# Patient Record
Sex: MALE | Race: WHITE | NOT HISPANIC OR LATINO | Employment: FULL TIME | URBAN - METROPOLITAN AREA
[De-identification: names, ages, dates, MRNs, and addresses within clinical notes are randomized per-mention and may not be internally consistent; named-entity substitution may affect disease eponyms.]

---

## 2018-05-01 ENCOUNTER — TRANSCRIBE ORDERS (OUTPATIENT)
Dept: ADMINISTRATIVE | Facility: HOSPITAL | Age: 33
End: 2018-05-01

## 2018-05-01 DIAGNOSIS — R07.9 CHEST PAIN, UNSPECIFIED TYPE: Primary | ICD-10-CM

## 2018-05-07 ENCOUNTER — TRANSCRIBE ORDERS (OUTPATIENT)
Dept: ADMINISTRATIVE | Facility: HOSPITAL | Age: 33
End: 2018-05-07

## 2018-05-07 ENCOUNTER — APPOINTMENT (OUTPATIENT)
Dept: LAB | Facility: MEDICAL CENTER | Age: 33
End: 2018-05-07
Payer: COMMERCIAL

## 2018-05-07 ENCOUNTER — APPOINTMENT (OUTPATIENT)
Dept: RADIOLOGY | Facility: MEDICAL CENTER | Age: 33
End: 2018-05-07
Payer: COMMERCIAL

## 2018-05-07 DIAGNOSIS — I73.01 RAYNAUD'S DISEASE WITH GANGRENE (HCC): Primary | ICD-10-CM

## 2018-05-07 DIAGNOSIS — I73.00 RAYNAUD'S DISEASE WITHOUT GANGRENE: Primary | ICD-10-CM

## 2018-05-07 DIAGNOSIS — E78.2 MIXED HYPERLIPIDEMIA: ICD-10-CM

## 2018-05-07 DIAGNOSIS — I10 ESSENTIAL HYPERTENSION, BENIGN: ICD-10-CM

## 2018-05-07 DIAGNOSIS — R06.02 SOB (SHORTNESS OF BREATH): ICD-10-CM

## 2018-05-07 DIAGNOSIS — N39.0 URINARY TRACT INFECTION WITHOUT HEMATURIA, SITE UNSPECIFIED: ICD-10-CM

## 2018-05-07 LAB
ALBUMIN SERPL BCP-MCNC: 4.1 G/DL (ref 3.5–5)
ALP SERPL-CCNC: 81 U/L (ref 46–116)
ALT SERPL W P-5'-P-CCNC: 43 U/L (ref 12–78)
ANION GAP SERPL CALCULATED.3IONS-SCNC: 4 MMOL/L (ref 4–13)
AST SERPL W P-5'-P-CCNC: 29 U/L (ref 5–45)
BASOPHILS # BLD AUTO: 0.02 THOUSANDS/ΜL (ref 0–0.1)
BASOPHILS NFR BLD AUTO: 0 % (ref 0–1)
BILIRUB SERPL-MCNC: 0.53 MG/DL (ref 0.2–1)
BILIRUB UR QL STRIP: NEGATIVE
BUN SERPL-MCNC: 8 MG/DL (ref 5–25)
CALCIUM SERPL-MCNC: 9.3 MG/DL (ref 8.3–10.1)
CHLORIDE SERPL-SCNC: 108 MMOL/L (ref 100–108)
CHOLEST SERPL-MCNC: 178 MG/DL (ref 50–200)
CLARITY UR: CLEAR
CO2 SERPL-SCNC: 29 MMOL/L (ref 21–32)
COLOR UR: YELLOW
CREAT SERPL-MCNC: 0.86 MG/DL (ref 0.6–1.3)
EOSINOPHIL # BLD AUTO: 0.08 THOUSAND/ΜL (ref 0–0.61)
EOSINOPHIL NFR BLD AUTO: 1 % (ref 0–6)
ERYTHROCYTE [DISTWIDTH] IN BLOOD BY AUTOMATED COUNT: 13.7 % (ref 11.6–15.1)
ERYTHROCYTE [SEDIMENTATION RATE] IN BLOOD: 1 MM/HOUR (ref 0–10)
GFR SERPL CREATININE-BSD FRML MDRD: 113 ML/MIN/1.73SQ M
GLUCOSE P FAST SERPL-MCNC: 77 MG/DL (ref 65–99)
GLUCOSE UR STRIP-MCNC: NEGATIVE MG/DL
HCT VFR BLD AUTO: 48.6 % (ref 36.5–49.3)
HDLC SERPL-MCNC: 82 MG/DL (ref 40–60)
HGB BLD-MCNC: 16.7 G/DL (ref 12–17)
HGB UR QL STRIP.AUTO: NEGATIVE
KETONES UR STRIP-MCNC: NEGATIVE MG/DL
LDLC SERPL CALC-MCNC: 83 MG/DL (ref 0–100)
LEUKOCYTE ESTERASE UR QL STRIP: NEGATIVE
LYMPHOCYTES # BLD AUTO: 1.27 THOUSANDS/ΜL (ref 0.6–4.47)
LYMPHOCYTES NFR BLD AUTO: 11 % (ref 14–44)
MCH RBC QN AUTO: 32.7 PG (ref 26.8–34.3)
MCHC RBC AUTO-ENTMCNC: 34.4 G/DL (ref 31.4–37.4)
MCV RBC AUTO: 95 FL (ref 82–98)
MONOCYTES # BLD AUTO: 0.86 THOUSAND/ΜL (ref 0.17–1.22)
MONOCYTES NFR BLD AUTO: 7 % (ref 4–12)
NEUTROPHILS # BLD AUTO: 9.54 THOUSANDS/ΜL (ref 1.85–7.62)
NEUTS SEG NFR BLD AUTO: 81 % (ref 43–75)
NITRITE UR QL STRIP: NEGATIVE
NONHDLC SERPL-MCNC: 96 MG/DL
NRBC BLD AUTO-RTO: 0 /100 WBCS
PH UR STRIP.AUTO: 6.5 [PH] (ref 4.5–8)
PLATELET # BLD AUTO: 195 THOUSANDS/UL (ref 149–390)
PMV BLD AUTO: 12 FL (ref 8.9–12.7)
POTASSIUM SERPL-SCNC: 4.8 MMOL/L (ref 3.5–5.3)
PROT SERPL-MCNC: 7.2 G/DL (ref 6.4–8.2)
PROT UR STRIP-MCNC: NEGATIVE MG/DL
RBC # BLD AUTO: 5.11 MILLION/UL (ref 3.88–5.62)
SODIUM SERPL-SCNC: 141 MMOL/L (ref 136–145)
SP GR UR STRIP.AUTO: 1.02 (ref 1–1.03)
TRIGL SERPL-MCNC: 66 MG/DL
TSH SERPL DL<=0.05 MIU/L-ACNC: 0.73 UIU/ML (ref 0.36–3.74)
UROBILINOGEN UR QL STRIP.AUTO: 0.2 E.U./DL
WBC # BLD AUTO: 11.8 THOUSAND/UL (ref 4.31–10.16)

## 2018-05-07 PROCEDURE — 86235 NUCLEAR ANTIGEN ANTIBODY: CPT | Performed by: INTERNAL MEDICINE

## 2018-05-07 PROCEDURE — 80061 LIPID PANEL: CPT | Performed by: INTERNAL MEDICINE

## 2018-05-07 PROCEDURE — 36415 COLL VENOUS BLD VENIPUNCTURE: CPT | Performed by: INTERNAL MEDICINE

## 2018-05-07 PROCEDURE — 85652 RBC SED RATE AUTOMATED: CPT | Performed by: INTERNAL MEDICINE

## 2018-05-07 PROCEDURE — 84443 ASSAY THYROID STIM HORMONE: CPT | Performed by: INTERNAL MEDICINE

## 2018-05-07 PROCEDURE — 71046 X-RAY EXAM CHEST 2 VIEWS: CPT

## 2018-05-07 PROCEDURE — 80053 COMPREHEN METABOLIC PANEL: CPT | Performed by: INTERNAL MEDICINE

## 2018-05-07 PROCEDURE — 85025 COMPLETE CBC W/AUTO DIFF WBC: CPT | Performed by: INTERNAL MEDICINE

## 2018-05-07 PROCEDURE — 81003 URINALYSIS AUTO W/O SCOPE: CPT | Performed by: INTERNAL MEDICINE

## 2018-05-08 LAB — ENA SCL70 AB SER-ACNC: <0.2 AI (ref 0–0.9)

## 2018-07-05 ENCOUNTER — HOSPITAL ENCOUNTER (OUTPATIENT)
Dept: NON INVASIVE DIAGNOSTICS | Facility: HOSPITAL | Age: 33
Discharge: HOME | End: 2018-07-05
Payer: COMMERCIAL

## 2018-07-05 DIAGNOSIS — R07.9 CHEST PAIN, UNSPECIFIED TYPE: ICD-10-CM

## 2018-07-05 PROCEDURE — 93351 STRESS TTE COMPLETE: CPT | Performed by: INTERNAL MEDICINE

## 2018-07-05 PROCEDURE — 93350 STRESS TTE ONLY: CPT

## 2018-07-09 LAB
CHEST PAIN STATEMENT: NORMAL
MAX DIASTOLIC BP: 78 MMHG
MAX HEART RATE: 169 BPM
MAX PREDICTED HEART RATE: 185 BPM
MAX. SYSTOLIC BP: 182 MMHG
PROTOCOL NAME: NORMAL
REASON FOR TERMINATION: NORMAL
TARGET HR FORMULA: NORMAL
TEST INDICATION: NORMAL
TIME IN EXERCISE PHASE: NORMAL

## 2020-05-02 ENCOUNTER — APPOINTMENT (OUTPATIENT)
Dept: RADIOLOGY | Facility: MEDICAL CENTER | Age: 35
End: 2020-05-02
Payer: COMMERCIAL

## 2020-05-02 ENCOUNTER — OFFICE VISIT (OUTPATIENT)
Dept: URGENT CARE | Facility: MEDICAL CENTER | Age: 35
End: 2020-05-02
Payer: COMMERCIAL

## 2020-05-02 VITALS
OXYGEN SATURATION: 97 % | BODY MASS INDEX: 22.52 KG/M2 | HEIGHT: 68 IN | SYSTOLIC BLOOD PRESSURE: 127 MMHG | WEIGHT: 148.6 LBS | TEMPERATURE: 96.9 F | HEART RATE: 99 BPM | DIASTOLIC BLOOD PRESSURE: 75 MMHG

## 2020-05-02 DIAGNOSIS — S65.512A LACERATION OF BLOOD VESSEL OF RIGHT MIDDLE FINGER, INITIAL ENCOUNTER: ICD-10-CM

## 2020-05-02 DIAGNOSIS — S65.512A LACERATION OF BLOOD VESSEL OF RIGHT MIDDLE FINGER, INITIAL ENCOUNTER: Primary | ICD-10-CM

## 2020-05-02 PROCEDURE — 73140 X-RAY EXAM OF FINGER(S): CPT

## 2020-05-02 PROCEDURE — 99213 OFFICE O/P EST LOW 20 MIN: CPT | Performed by: PHYSICIAN ASSISTANT

## 2020-05-02 RX ORDER — CEPHALEXIN 500 MG/1
500 CAPSULE ORAL EVERY 6 HOURS SCHEDULED
Qty: 28 CAPSULE | Refills: 0 | Status: SHIPPED | OUTPATIENT
Start: 2020-05-02 | End: 2020-05-09

## 2020-05-02 RX ORDER — BUPROPION HYDROCHLORIDE 300 MG/1
300 TABLET ORAL DAILY
COMMUNITY

## 2023-03-11 ENCOUNTER — APPOINTMENT (EMERGENCY)
Dept: RADIOLOGY | Facility: HOSPITAL | Age: 38
End: 2023-03-11

## 2023-03-11 ENCOUNTER — APPOINTMENT (EMERGENCY)
Dept: CT IMAGING | Facility: HOSPITAL | Age: 38
End: 2023-03-11

## 2023-03-11 ENCOUNTER — HOSPITAL ENCOUNTER (EMERGENCY)
Facility: HOSPITAL | Age: 38
End: 2023-03-11
Attending: EMERGENCY MEDICINE

## 2023-03-11 ENCOUNTER — HOSPITAL ENCOUNTER (INPATIENT)
Facility: HOSPITAL | Age: 38
LOS: 2 days | Discharge: HOME | End: 2023-03-13
Attending: EMERGENCY MEDICINE | Admitting: EMERGENCY MEDICINE

## 2023-03-11 VITALS
RESPIRATION RATE: 16 BRPM | BODY MASS INDEX: 22.31 KG/M2 | TEMPERATURE: 97.9 F | DIASTOLIC BLOOD PRESSURE: 86 MMHG | SYSTOLIC BLOOD PRESSURE: 130 MMHG | WEIGHT: 145.5 LBS | OXYGEN SATURATION: 99 % | HEART RATE: 87 BPM

## 2023-03-11 DIAGNOSIS — F32.A DEPRESSION: ICD-10-CM

## 2023-03-11 DIAGNOSIS — F10.939 ALCOHOL WITHDRAWAL (HCC): Primary | ICD-10-CM

## 2023-03-11 DIAGNOSIS — F10.20 ALCOHOL USE DISORDER, SEVERE, DEPENDENCE (HCC): Primary | ICD-10-CM

## 2023-03-11 DIAGNOSIS — K29.20 ACUTE ALCOHOLIC GASTRITIS WITHOUT HEMORRHAGE: ICD-10-CM

## 2023-03-11 DIAGNOSIS — R74.8 ELEVATED LIVER ENZYMES: ICD-10-CM

## 2023-03-11 DIAGNOSIS — K29.70 GASTRITIS: ICD-10-CM

## 2023-03-11 DIAGNOSIS — I85.00 VARICES, ESOPHAGEAL (HCC): ICD-10-CM

## 2023-03-11 DIAGNOSIS — K76.6 PORTAL HYPERTENSION (HCC): ICD-10-CM

## 2023-03-11 DIAGNOSIS — I10 HYPERTENSION, UNSPECIFIED TYPE: ICD-10-CM

## 2023-03-11 DIAGNOSIS — K70.10 ALCOHOLIC HEPATITIS WITHOUT ASCITES: ICD-10-CM

## 2023-03-11 DIAGNOSIS — I86.4 VARICES, GASTRIC: ICD-10-CM

## 2023-03-11 PROBLEM — N17.9 AKI (ACUTE KIDNEY INJURY) (HCC): Status: ACTIVE | Noted: 2023-03-11

## 2023-03-11 PROBLEM — E87.1 HYPONATREMIA: Status: ACTIVE | Noted: 2023-03-11

## 2023-03-11 PROBLEM — R79.89 ELEVATED SERUM CREATININE: Status: ACTIVE | Noted: 2023-03-11

## 2023-03-11 LAB
ALBUMIN SERPL BCP-MCNC: 3.2 G/DL (ref 3.5–5)
ALP SERPL-CCNC: 416 U/L (ref 34–104)
ALT SERPL W P-5'-P-CCNC: 166 U/L (ref 7–52)
ANION GAP SERPL CALCULATED.3IONS-SCNC: 10 MMOL/L (ref 4–13)
ANION GAP SERPL CALCULATED.3IONS-SCNC: 3 MMOL/L (ref 5–14)
APTT PPP: 26 SECONDS (ref 23–37)
AST SERPL W P-5'-P-CCNC: 331 U/L (ref 13–39)
BASOPHILS # BLD AUTO: 0.03 THOUSANDS/ÂΜL (ref 0–0.1)
BASOPHILS NFR BLD AUTO: 0 % (ref 0–1)
BILIRUB SERPL-MCNC: 4.29 MG/DL (ref 0.2–1)
BUN SERPL-MCNC: 6 MG/DL (ref 5–25)
BUN SERPL-MCNC: 7 MG/DL (ref 5–25)
CALCIUM ALBUM COR SERPL-MCNC: 9.3 MG/DL (ref 8.3–10.1)
CALCIUM SERPL-MCNC: 8.3 MG/DL (ref 8.4–10.2)
CALCIUM SERPL-MCNC: 8.7 MG/DL (ref 8.4–10.2)
CHLORIDE SERPL-SCNC: 103 MMOL/L (ref 96–108)
CHLORIDE SERPL-SCNC: 97 MMOL/L (ref 96–108)
CO2 SERPL-SCNC: 26 MMOL/L (ref 21–32)
CO2 SERPL-SCNC: 28 MMOL/L (ref 21–32)
CREAT SERPL-MCNC: 0.91 MG/DL (ref 0.7–1.5)
CREAT SERPL-MCNC: 1.01 MG/DL (ref 0.6–1.3)
EOSINOPHIL # BLD AUTO: 0.02 THOUSAND/ÂΜL (ref 0–0.61)
EOSINOPHIL NFR BLD AUTO: 0 % (ref 0–6)
ERYTHROCYTE [DISTWIDTH] IN BLOOD BY AUTOMATED COUNT: 14.9 % (ref 11.6–15.1)
ETHANOL SERPL-MCNC: 20 MG/DL
GFR SERPL CREATININE-BSD FRML MDRD: 105 ML/MIN/1.73SQ M
GFR SERPL CREATININE-BSD FRML MDRD: 93 ML/MIN/1.73SQ M
GLUCOSE SERPL-MCNC: 108 MG/DL (ref 70–99)
GLUCOSE SERPL-MCNC: 73 MG/DL (ref 65–140)
HCT VFR BLD AUTO: 36.4 % (ref 36.5–49.3)
HGB BLD-MCNC: 12.1 G/DL (ref 12–17)
IMM GRANULOCYTES # BLD AUTO: 0.05 THOUSAND/UL (ref 0–0.2)
IMM GRANULOCYTES NFR BLD AUTO: 1 % (ref 0–2)
INR PPP: 0.98 (ref 0.84–1.19)
LIPASE SERPL-CCNC: 36 U/L (ref 11–82)
LYMPHOCYTES # BLD AUTO: 2.62 THOUSANDS/ÂΜL (ref 0.6–4.47)
LYMPHOCYTES NFR BLD AUTO: 36 % (ref 14–44)
MCH RBC QN AUTO: 34.5 PG (ref 26.8–34.3)
MCHC RBC AUTO-ENTMCNC: 33.2 G/DL (ref 31.4–37.4)
MCV RBC AUTO: 104 FL (ref 82–98)
MONOCYTES # BLD AUTO: 0.44 THOUSAND/ÂΜL (ref 0.17–1.22)
MONOCYTES NFR BLD AUTO: 6 % (ref 4–12)
NEUTROPHILS # BLD AUTO: 4.16 THOUSANDS/ÂΜL (ref 1.85–7.62)
NEUTS SEG NFR BLD AUTO: 57 % (ref 43–75)
NRBC BLD AUTO-RTO: 0 /100 WBCS
PLATELET # BLD AUTO: 148 THOUSANDS/UL (ref 149–390)
PMV BLD AUTO: 11.8 FL (ref 8.9–12.7)
POTASSIUM SERPL-SCNC: 3.8 MMOL/L (ref 3.5–5.3)
POTASSIUM SERPL-SCNC: 4.1 MMOL/L (ref 3.5–5.3)
PROT SERPL-MCNC: 5.9 G/DL (ref 6.4–8.4)
PROTHROMBIN TIME: 13.2 SECONDS (ref 11.6–14.5)
RBC # BLD AUTO: 3.51 MILLION/UL (ref 3.88–5.62)
SODIUM SERPL-SCNC: 133 MMOL/L (ref 135–147)
SODIUM SERPL-SCNC: 134 MMOL/L (ref 135–147)
WBC # BLD AUTO: 7.32 THOUSAND/UL (ref 4.31–10.16)

## 2023-03-11 RX ORDER — SODIUM CHLORIDE, SODIUM LACTATE, POTASSIUM CHLORIDE, CALCIUM CHLORIDE 600; 310; 30; 20 MG/100ML; MG/100ML; MG/100ML; MG/100ML
1000 INJECTION, SOLUTION INTRAVENOUS CONTINUOUS
Status: DISCONTINUED | OUTPATIENT
Start: 2023-03-11 | End: 2023-03-11 | Stop reason: HOSPADM

## 2023-03-11 RX ORDER — SUCRALFATE 1 G/1
1 TABLET ORAL
Status: DISCONTINUED | OUTPATIENT
Start: 2023-03-11 | End: 2023-03-13 | Stop reason: HOSPADM

## 2023-03-11 RX ORDER — TRAZODONE HYDROCHLORIDE 50 MG/1
50 TABLET ORAL
Status: DISCONTINUED | OUTPATIENT
Start: 2023-03-11 | End: 2023-03-13 | Stop reason: HOSPADM

## 2023-03-11 RX ORDER — PANTOPRAZOLE SODIUM 40 MG/10ML
40 INJECTION, POWDER, LYOPHILIZED, FOR SOLUTION INTRAVENOUS EVERY 12 HOURS SCHEDULED
Status: DISCONTINUED | OUTPATIENT
Start: 2023-03-11 | End: 2023-03-13

## 2023-03-11 RX ORDER — MAGNESIUM HYDROXIDE/ALUMINUM HYDROXICE/SIMETHICONE 120; 1200; 1200 MG/30ML; MG/30ML; MG/30ML
30 SUSPENSION ORAL EVERY 6 HOURS PRN
Status: DISCONTINUED | OUTPATIENT
Start: 2023-03-11 | End: 2023-03-13 | Stop reason: HOSPADM

## 2023-03-11 RX ORDER — NICOTINE 21 MG/24HR
21 PATCH, TRANSDERMAL 24 HOURS TRANSDERMAL ONCE
Status: COMPLETED | OUTPATIENT
Start: 2023-03-11 | End: 2023-03-12

## 2023-03-11 RX ORDER — PHENOBARBITAL 64.8 MG/1
64.8 TABLET ORAL ONCE
Status: COMPLETED | OUTPATIENT
Start: 2023-03-11 | End: 2023-03-11

## 2023-03-11 RX ORDER — LIDOCAINE HYDROCHLORIDE 20 MG/ML
15 SOLUTION OROPHARYNGEAL 4 TIMES DAILY PRN
Status: DISCONTINUED | OUTPATIENT
Start: 2023-03-11 | End: 2023-03-13 | Stop reason: HOSPADM

## 2023-03-11 RX ORDER — NICOTINE 21 MG/24HR
21 PATCH, TRANSDERMAL 24 HOURS TRANSDERMAL DAILY
Status: DISCONTINUED | OUTPATIENT
Start: 2023-03-12 | End: 2023-03-13 | Stop reason: HOSPADM

## 2023-03-11 RX ORDER — LANOLIN ALCOHOL/MO/W.PET/CERES
6 CREAM (GRAM) TOPICAL
Status: DISCONTINUED | OUTPATIENT
Start: 2023-03-11 | End: 2023-03-13 | Stop reason: HOSPADM

## 2023-03-11 RX ORDER — SODIUM CHLORIDE 9 MG/ML
100 INJECTION, SOLUTION INTRAVENOUS CONTINUOUS
Status: DISCONTINUED | OUTPATIENT
Start: 2023-03-11 | End: 2023-03-12

## 2023-03-11 RX ORDER — ONDANSETRON 2 MG/ML
4 INJECTION INTRAMUSCULAR; INTRAVENOUS EVERY 6 HOURS PRN
Status: DISCONTINUED | OUTPATIENT
Start: 2023-03-11 | End: 2023-03-13 | Stop reason: HOSPADM

## 2023-03-11 RX ORDER — LORAZEPAM 2 MG/ML
1 INJECTION INTRAMUSCULAR ONCE
Status: COMPLETED | OUTPATIENT
Start: 2023-03-11 | End: 2023-03-11

## 2023-03-11 RX ADMIN — NICOTINE 21 MG: 21 PATCH, EXTENDED RELEASE TRANSDERMAL at 17:20

## 2023-03-11 RX ADMIN — SODIUM CHLORIDE, SODIUM LACTATE, POTASSIUM CHLORIDE, AND CALCIUM CHLORIDE 1000 ML: .6; .31; .03; .02 INJECTION, SOLUTION INTRAVENOUS at 12:38

## 2023-03-11 RX ADMIN — PHENOBARBITAL 64.8 MG: 64.8 TABLET ORAL at 20:09

## 2023-03-11 RX ADMIN — LORAZEPAM 1 MG: 2 INJECTION INTRAMUSCULAR; INTRAVENOUS at 10:40

## 2023-03-11 RX ADMIN — FOLIC ACID: 5 INJECTION, SOLUTION INTRAMUSCULAR; INTRAVENOUS; SUBCUTANEOUS at 17:36

## 2023-03-11 RX ADMIN — SUCRALFATE 1 G: 1 TABLET ORAL at 21:14

## 2023-03-11 RX ADMIN — SODIUM CHLORIDE 125 ML/HR: 0.9 INJECTION, SOLUTION INTRAVENOUS at 15:22

## 2023-03-11 RX ADMIN — PANTOPRAZOLE SODIUM 40 MG: 40 INJECTION, POWDER, FOR SOLUTION INTRAVENOUS at 20:06

## 2023-03-11 RX ADMIN — SODIUM CHLORIDE 125 ML/HR: 0.9 INJECTION, SOLUTION INTRAVENOUS at 23:31

## 2023-03-11 RX ADMIN — IOHEXOL 100 ML: 350 INJECTION, SOLUTION INTRAVENOUS at 10:48

## 2023-03-11 RX ADMIN — TRAZODONE HYDROCHLORIDE 50 MG: 50 TABLET ORAL at 21:31

## 2023-03-11 RX ADMIN — SUCRALFATE 1 G: 1 TABLET ORAL at 17:21

## 2023-03-11 NOTE — ED PROVIDER NOTES
History  Chief Complaint   Patient presents with   • Vomiting     Pt /o vomiting x1 month, dehydration, hx of alcohol abuse going to rehab soon, last drink yesterday     64-year-old male presents to the emergency department with complaints of abdominal pain  States that he has had some ongoing issues with nausea and vomiting with inability to tolerate food or beverage over the past few days  Describes diffuse abdominal pain with some radiation to the hips bilaterally  History of alcohol abuse  States that he drinks approximately 4 cases of beer a week  Last drink was yesterday  Has made arrangements to go to a detox facility in 2 days  Has not been to detox before  History provided by:  Patient and parent   used: No    Vomiting  Severity:  Moderate  Timing:  Intermittent  Progression:  Unchanged  Chronicity:  New  Recent urination:  Normal  Relieved by:  Nothing  Ineffective treatments:  None tried  Associated symptoms: abdominal pain, cough and diarrhea    Associated symptoms: no arthralgias, no chills, no fever, no headaches, no myalgias, no sore throat and no URI        Prior to Admission Medications   Prescriptions Last Dose Informant Patient Reported? Taking? buPROPion (WELLBUTRIN XL) 300 mg 24 hr tablet   Yes No   Sig: Take 300 mg by mouth daily      Facility-Administered Medications: None       Past Medical History:   Diagnosis Date   • Anxiety        History reviewed  No pertinent surgical history  History reviewed  No pertinent family history  I have reviewed and agree with the history as documented      E-Cigarette/Vaping   • E-Cigarette Use Never User      E-Cigarette/Vaping Substances   • Nicotine No    • THC No    • CBD No    • Flavoring No    • Other No    • Unknown No      Social History     Tobacco Use   • Smoking status: Every Day     Packs/day: 1 00     Years: 20 00     Pack years: 20 00     Types: Cigarettes     Passive exposure: Current   • Smokeless tobacco: Current     Types: Chew   Vaping Use   • Vaping Use: Never used   Substance Use Topics   • Alcohol use: Yes     Alcohol/week: 90 0 standard drinks     Types: 90 Cans of beer per week   • Drug use: Yes     Frequency: 1 0 times per week     Types: Marijuana       Review of Systems   Constitutional: Negative for activity change, appetite change, chills and fever  HENT: Negative for congestion, dental problem, drooling, ear discharge, ear pain, mouth sores, nosebleeds, rhinorrhea, sore throat and trouble swallowing  Eyes: Negative for pain, discharge and itching  Respiratory: Positive for cough  Negative for chest tightness, shortness of breath and wheezing  Cardiovascular: Negative for chest pain and palpitations  Gastrointestinal: Positive for abdominal pain, diarrhea and vomiting  Negative for blood in stool, constipation and nausea  Endocrine: Negative for cold intolerance and heat intolerance  Genitourinary: Negative for difficulty urinating, dysuria, flank pain, frequency and urgency  Musculoskeletal: Negative for arthralgias and myalgias  Skin: Negative for rash and wound  Allergic/Immunologic: Negative for food allergies and immunocompromised state  Neurological: Negative for dizziness, seizures, syncope, weakness, numbness and headaches  Psychiatric/Behavioral: Negative for agitation, behavioral problems and confusion  Physical Exam  Physical Exam  Vitals and nursing note reviewed  Constitutional:       General: He is not in acute distress  Appearance: He is not diaphoretic  HENT:      Head: Normocephalic and atraumatic  Right Ear: External ear normal       Left Ear: External ear normal       Mouth/Throat:      Mouth: Mucous membranes are dry  Eyes:      Conjunctiva/sclera: Conjunctivae normal    Neck:      Vascular: No JVD  Trachea: No tracheal deviation  Cardiovascular:      Rate and Rhythm: Normal rate and regular rhythm        Heart sounds: Normal heart sounds  No murmur heard  No friction rub  No gallop  Pulmonary:      Effort: Pulmonary effort is normal  No respiratory distress  Breath sounds: Normal breath sounds  No wheezing or rales  Chest:      Chest wall: No tenderness  Abdominal:      General: Bowel sounds are normal  There is no distension  Palpations: Abdomen is soft  Tenderness: There is abdominal tenderness  There is no guarding  Musculoskeletal:         General: No tenderness or deformity  Normal range of motion  Lymphadenopathy:      Cervical: No cervical adenopathy  Skin:     General: Skin is warm and dry  Findings: No erythema or rash  Neurological:      General: No focal deficit present  Mental Status: He is alert and oriented to person, place, and time  Mental status is at baseline  GCS: GCS eye subscore is 4  GCS verbal subscore is 5  GCS motor subscore is 6  Motor: Tremor present  Psychiatric:         Mood and Affect: Mood is anxious           Behavior: Behavior normal          Vital Signs  ED Triage Vitals   Temperature Pulse Respirations Blood Pressure SpO2   03/11/23 0940 03/11/23 0940 03/11/23 0940 03/11/23 0940 03/11/23 0940   97 9 °F (36 6 °C) 101 18 (!) 179/103 99 %      Temp Source Heart Rate Source Patient Position - Orthostatic VS BP Location FiO2 (%)   03/11/23 0940 03/11/23 0940 03/11/23 0940 03/11/23 1044 --   Oral Monitor Sitting Right arm       Pain Score       --                  Vitals:    03/11/23 1100 03/11/23 1200 03/11/23 1300 03/11/23 1400   BP: 142/92 132/85 140/90 130/86   Pulse: 92 101 93 87   Patient Position - Orthostatic VS: Sitting Sitting Sitting Sitting         Visual Acuity      ED Medications  Medications   LORazepam (ATIVAN) injection 1 mg (1 mg Intravenous Given 3/11/23 1040)   iohexol (OMNIPAQUE) 350 MG/ML injection (SINGLE-DOSE) 100 mL (100 mL Intravenous Given 3/11/23 1048)       Diagnostic Studies  Results Reviewed     Procedure Component Value Units Date/Time    Comprehensive metabolic panel [[id number]]  (Abnormal) Collected: 03/11/23 0955    Lab Status: Final result Specimen: Blood from Arm, Left Updated: 03/11/23 1026     Sodium 133 mmol/L      Potassium 4 1 mmol/L      Chloride 97 mmol/L      CO2 26 mmol/L      ANION GAP 10 mmol/L      BUN 7 mg/dL      Creatinine 1 01 mg/dL      Glucose 73 mg/dL      Calcium 8 7 mg/dL      Corrected Calcium 9 3 mg/dL       U/L       U/L      Alkaline Phosphatase 416 U/L      Total Protein 5 9 g/dL      Albumin 3 2 g/dL      Total Bilirubin 4 29 mg/dL      eGFR 93 ml/min/1 73sq m     Narrative:      [place] guidelines for Chronic Kidney Disease (CKD):   •  Stage 1 with normal or high GFR (GFR > 90 mL/min/1 73 square meters)  •  Stage 2 Mild CKD (GFR = 60-89 mL/min/1 73 square meters)  •  Stage 3A Moderate CKD (GFR = 45-59 mL/min/1 73 square meters)  •  Stage 3B Moderate CKD (GFR = 30-44 mL/min/1 73 square meters)  •  Stage 4 Severe CKD (GFR = 15-29 mL/min/1 73 square meters)  •  Stage 5 End Stage CKD (GFR <15 mL/min/1 73 square meters)  Note: GFR calculation is accurate only with a steady state creatinine    Lipase [[id number]]  (Normal) Collected: 03/11/23 0955    Lab Status: Final result Specimen: Blood from Arm, Left Updated: 03/11/23 1026     Lipase 36 u/L     Ethanol [[id number]]  (Abnormal) Collected: 03/11/23 0955    Lab Status: Final result Specimen: Blood from Arm, Left Updated: 03/11/23 1025     Ethanol Lvl 20 mg/dL     Protime-INR [[id number]]  (Normal) Collected: 03/11/23 0955    Lab Status: Final result Specimen: Blood from Arm, Left Updated: 03/11/23 1022     Protime 13 2 seconds      INR 0 98    APTT [[id number]]  (Normal) Collected: 03/11/23 0955    Lab Status: Final result Specimen: Blood from Arm, Left Updated: 03/11/23 1022     PTT 26 seconds     CBC and differential [[id number]]  (Abnormal) Collected: 03/11/23 0955    Lab Status: Final result Specimen: Blood from Arm, Left Updated: 03/11/23 1015     WBC 7 32 Thousand/uL      RBC 3 51 Million/uL      Hemoglobin 12 1 g/dL      Hematocrit 36 4 %       fL      MCH 34 5 pg      MCHC 33 2 g/dL      RDW 14 9 %      MPV 11 8 fL      Platelets 176 Thousands/uL      nRBC 0 /100 WBCs      Neutrophils Relative 57 %      Immat GRANS % 1 %      Lymphocytes Relative 36 %      Monocytes Relative 6 %      Eosinophils Relative 0 %      Basophils Relative 0 %      Neutrophils Absolute 4 16 Thousands/µL      Immature Grans Absolute 0 05 Thousand/uL      Lymphocytes Absolute 2 62 Thousands/µL      Monocytes Absolute 0 44 Thousand/µL      Eosinophils Absolute 0 02 Thousand/µL      Basophils Absolute 0 03 Thousands/µL     Rapid drug screen, urine [[id number]]     Lab Status: No result Specimen: Urine     UA w Reflex to Microscopic w Reflex to Culture [[id number]]     Lab Status: No result Specimen: Urine                  CT abdomen pelvis with contrast   Final Result by Jenifer Van MD (03/11 1158)      Hepatomegaly with significant fatty infiltration  The gallbladder is contracted with possible wall thickening  Gastric fold thickening appears be present in could be related to gastritis  Endoscopic follow-up should be considered  A few prominent small bowel loops are present without zone of transition and could be related to ileus or gastroenteritis  Possible small varices in the gastrohepatic ligament could suggest some portal hypertension  Follow-up with GI is advised        This was discussed with Dr Lya Mccoy on 3/11/2023      Workstation performed: [id number]         XR chest 2 views   ED Interpretation by Emily Cruz PA-C (03/11 1014)   Clear lungs      Final Result by Ac Centeno MD (03/11 1396)      No acute consolidation or congestion                  Workstation performed: [id number]                    Procedures  Procedures         ED Course                                             Medical Decision Making  Differential diagnosis includes but not limited to: Alcohol withdrawal, electrolyte abnormality, pancreatitis,    Amount and/or Complexity of Data Reviewed  Labs: ordered  Radiology: ordered  Disposition  Final diagnoses:   Alcohol withdrawal ([facility] )   Elevated liver enzymes     Time reflects when diagnosis was documented in both MDM as applicable and the Disposition within this note     Time User Action Codes Description Comment    3/11/2023  1:09 PM Alexia James Add [F10 [id number]] Alcohol withdrawal ([facility] )     3/11/2023  1:09 PM Alexia James Add [R74 [id number]] Elevated liver enzymes       ED Disposition     ED Disposition   Transfer to Another Facility-In Network    Condition   --    Date/Time   Sat Mar 11, 2023  1:09 PM    Comment   Neto Menendez should be transferred out to [facility]             MD Jayant Gay Most Recent Value   Patient Condition The patient has been stabilized such that within reasonable medical probability, no material deterioration of the patient condition or the condition of the unborn child(teresa) is likely to result from the transfer   Reason for Transfer Level of Care needed not available at this facility   Benefits of Transfer Specialized equipment and/or services available at the receiving facility (Include comment)________________________   Risks of Transfer Potential for delay in receiving treatment, Potential deterioration of medical condition, Loss of IV, Increased discomfort during transfer   Accepting Physician Dr David Solis Name, [address] [facility]    (Name & Tel number) Matthew Dyer   Sending MD Clarissa García PA-C   Provider Certification General risk, such as traffic hazards, adverse weather conditions, rough terrain or turbulence, possible failure of equipment (including vehicle or aircraft), or consequences of actions of persons outside the control of the transport personnel      RN Documentation    Flowsheet Row Most Recent Value   Accepting Facility Name, [facility]    (Name & Tel number) Adrianne Mosqueda      Follow-up Information    None         Discharge Medication List as of 3/11/2023  2:47 PM      CONTINUE these medications which have NOT CHANGED    Details   buPROPion (WELLBUTRIN XL) 300 mg 24 hr tablet Take 300 mg by mouth daily, Historical Med             No discharge procedures on file      PDMP Review     None          ED Provider  Electronically Signed by           Jerrica Gloria PA-C  03/11/23 9595

## 2023-03-11 NOTE — H&P
[facility]  H&P- Floria Lie 1985, 42 y o  male MRN: [id number]  Unit/Bed#: 5T DETOX 510-01 Encounter: [id number]  Primary Care Provider: Anat Agudelo MD   Date and time admitted to hospital: 3/11/2023  3:07 PM    Reason for Admission/Principal Problem: Ethanol withdrawal, Ethanol use disorder  Admitting Provider: Aashish Dao PA-C  Attending Provider: Marlene Looney DO   3/11/2023  3:07 PM      * Alcohol withdrawal syndrome with complication [facility])  Assessment & Plan  H/o chronic daily alcohol consumption, denies h/o withdrawal seizures  · States he has not stopped drinking long enough to experience withdrawal   Last reported drink 3/10/2023 around 2300; 1 bottle of wine  Ethanol 20 (3/11/2023 0955)  Received 1 mg Ativan IV in ED PTA  Follow SEWS protocol with symptom-triggered phenobarbital for medically-assisted alcohol withdrawal  Current symptoms appear mild- states symptoms improved following ativan and   Continue to monitor vitals, symptoms and administer additional phenobarbital as indicated   · Telemetry and continuous pulse-ox monitoring     Alcoholic hepatitis without ascites  Assessment & Plan  Recent Labs     03/11/23  0955   *   *   ALKPHOS 416*   TBILI 4 29*     · Reports intermittent epigastric/lower abdominal pain and nausea with non-bloody emesis daily x approximately 1 month   · Denies acute abdominal pain at rest currently; reports symptoms have improved since he was in the ED   · In setting of chronic alcohol use  · LFTs and bilirubin elevated on admission  · INR WNL   · Calculated MDF: 9 8  · CT abd/pelvis 3/11/2023: "Hepatomegaly with significant fatty infiltration   Possible small varices in the gastrohepatic ligament could suggest some portal hypertension"   · On exam: scleral icterus and jaundice noted, mild diffuse abdominal discomfort  · Continue to monitor CMP, check direct bilirubin  · Monitor with supportive care   · Will check RUQ U/S  · Consult GI given CT findings, appreciate recommendations   · Encourage alcohol cessation     Alcoholic gastritis  Assessment & Plan  · Reports intermittent epigastric/lower abdominal pain, anorexia, and nausea with non-bloody emesis daily x approximately 1 month   · Denies acute abdominal pain at rest currently; reports symptoms have improved since he was in the ED   · CT a/p 3/11/2023: "Gastric fold thickening appears be present in could be related to gastritis  Endoscopic follow-up should be considered"  · Lipase WNL   · Initiate IV protonix 40 mg q 12, carafate  · Supportive meds including zofran, mylanta, viscous lidocaine  · Consult GI given CT findings, appreciate recommendations  · Encourage PO intake as tolerated: non-ulcerogenic diet   · Encourage alcohol cessation     EDWIN (acute kidney injury) ([facility] [address] )  Assessment & Plan  · Creatinine 1 01 on admission, in setting of dehydration 2/2 decreased PO intake and vomiting   · Unclear baseline 2/2 limited historical data: most recent CMP 5/7/2018- creatinine 0 86  · Initiate IVFs  · Continue to monitor and encourage adequate PO intake     Nausea and vomiting  Assessment & Plan  · Reports intermittent epigastric/lower abdominal pain, anorexia, and nausea with non-bloody emesis daily x approximately 1 month   · Denies acute abdominal pain at rest currently; reports symptoms have improved since he was in the ED   · Denies diarrhea   · CT abd/pelvis 3/11/2023: "Hepatomegaly with significant fatty infiltration  The gallbladder is contracted with possible wall thickening  Gastric fold thickening appears be present in could be related to gastritis  Endoscopic follow-up should be considered  A few prominent small bowel loops are present without zone of transition and could be related to ileus or gastroenteritis  Possible small varices in the gastrohepatic ligament could suggest some portal hypertension   No ascites"  · See management of alcoholic hepatitis and alcoholic gastritis above   · Continue to monitor with supportive care     Thrombocytopenia [facility])  Assessment & Plan  Recent Labs     03/11/23  0955   *      · Mild, in setting of chronic alcohol use   · Likely 2/2 myelosuppression  · No evidence of acute bleeding  · Initiate thiamine and folic acid  · Continue to monitor  · Encourage alcohol cessation     Macrocytic anemia  Assessment & Plan  Recent Labs     03/11/23  0955   HGB 12 1   HCT 36 4*     · In setting of chronic alcohol use  · 2/2 myelosuppression  · No evidence of acute bleeding- denies hematemesis, melena, hematochezia  · Initiate thiamine, folic acid  · Continue to monitor  · Encourage alcohol cessation     Hyponatremia  Assessment & Plan  · Sodium 133 on admission   · In setting of chronic alcohol use and decreased PO intake   · Continue to monitor   · Consider nephrology consult if sodium decreases <130  · Encourage adequate PO intake and alcohol cessation     HTN (hypertension)  Assessment & Plan  · H/o HTN  · Reports currently prescribed amlodipine 5 mg daily, but notes he has been non-compliant x 1 month  · BP on admission: 145/89  · Likely elevated in setting of acute withdrawal  · Continue to monitor vitals, BP  · Consider restarting amlodipine if BP remains elevated despite resolution of acute withdrawal   · Recommend outpatient f/u PCP    Alcohol use disorder, severe, dependence ([facility] [address] )  Assessment & Plan  Patient with h/o chronic alcohol use x 20+ yrs   Currently consumes 12-13 cans of beer daily; was drinking wine the past few days as it was "all he could keep down"  Denies significant periods of sobriety  Last reported drink 3/10/2023 around 2300; 1 bottle of wine  Ethanol 20 (3/11/2023 0955)  Initiate IVFs, daily thiamine/folic acid supplementation  Has never been on MAT before but interested in discussing   · Currently not a candidate for naltrexone 2/2 LFT elevation, can consider acamprosate   Manage withdrawal as above  Consult case management for assistance with disposition planning    Abnormal CT of the abdomen  Assessment & Plan  · CT 3/11/2023: "Possible small varices in the gastrohepatic ligament could suggest some portal hypertension "  · In setting of chronic alcohol use   · See alcoholic hepatitis above  · GI consulted, appreciate recommendations     Cigarette nicotine dependence without complication  Assessment & Plan  Current every day smoker: 1-2 ppd  Encourage cessation  Offer nicotine replacement    Anxiety and depression  Assessment & Plan  · H/o anxiety and depression  · Previously prescribed Wellbutrin however states he is no longer taking  · Does not currently follow with outpatient psychiatry   · Currently notes depression; denies acute SI/thoughts of self-harm  · States he would consider an SSRI, but notes he would prefer discussing with psychiatry first  · Consult psychiatry per patient's request, appreciate recommendations  · Recommend outpatient f/u PCP, psychiatry    VTE Prophylaxis: Pharmacologic VTE Prophylaxis contraindicated due to possible varices on CT?  / sequential compression device   Code Status: level 1 full code       Anticipated Length of Stay:  Patient will be admitted on an Inpatient basis with an anticipated length of stay of  >2  midnights  Justification for Hospital Stay: ongoing medical management of alcohol withdrawal    For any questions or concerns, please Tiger Text the advanced practitioner in the role of [facility]-DETOX-AP On Call      This patient qualifies for Level IV medically managed intensive inpatient services under the criteria set by the American Society of Addiction Medicine, including dimensions 1-3   The patient is in withdrawal (or is intoxicated with high risk of withdrawal), with severe and unstable medical and/or psychiatric (dual diagnosis) problems, requiring requires 24-hour medical and nursing care and the full resources of a licensed [facility] 1 PROTOCOL FOR ALCOHOL WITHDRAWAL    Admit patient to medical detox unit and continue supportive care and stabilization of acute ethanol withdrawal per medical toxicology/detox treatment pathway  Monitor ethanol withdrawal severity via the Severity of Ethanol Withdrawal Scale (SEWS) Q4 hours and then hourly if/when SEWS > 6  Treat withdrawal per pathway and reassess Q30-60 minutes  Mild SEWS Score 1-6  Administer medications* (IV or PO; PO preferred):  • If initial SEWS score: diazepam 10mg PO/IV x 1 AND phenobarbital 65 mg PO/IV x 1  • If repeat SEWS score 1-6: phenobarbital 65 mg PO/IV q1 hour x 5 doses maximum   Reassessment:   • SEWS q1 hour after each dose until SEWS 0 x 2 hours  • VS q1 hours (until SEWS 0, then q4 hours)  • Notify provider for bedside evaluation if 5-dose maximum is reached, RASS of -3 to -5, or SEWS score escalates to moderate or severe  Moderate SEWS Score 7-12  Administer medications* (IV):  • If initial SEWS score: diazepam 10mg IV x 1 AND phenobarbital 260 mg IV x 1  • If repeat SEWS score 7-12 or score escalated from mild: phenobarbital 130 mg IV q30 minutes x 5 doses maximum   Reassessment:  • SEWS q30 minutes after each dose until SEWS < 7 (then hourly until SEWS 0 x 2 hours)  • VS q30 minutes until SEWS < 7 (then hourly until SEWS 0, then q4 hours)  • Notify provider for bedside evaluation if 5-dose maximum is reached, RASS of -3 to -5, or SEWS score escalates to severe  Severe SEWS Score ? 13  Administer medications* (IV):  • If initial SEWS score: Diazepam 10 mg IV x 1 AND phenobarbital 650 mg IV piggyback x 1 over 15-30 minutes  • If repeat SEWS score ?  13 or score escalated from mild or moderate: phenobarbital 130 mg IV q30 minutes x 5 doses maximum   Reassessment:  • SEWS q30 minutes after each dose until SEWS < 7 (then hourly until SEWS 0 x 2 hours)   • VS q30 minutes until SEWS < 7 (then hourly until SEWS 0, then q4 hours)  • Notify provider for bedside evaluation if 5-dose maximum is reached or RASS of -3 to -5   *Hold medications and notify provider if CNS depression, respirations < 10/min, or RASS of -3 to -5  Medications to be administered adjunctively if more than 2 grams of phenobarbital is needed for stabilization of withdrawal; require attending approval    • Dexmedetomidine infusion 0 1-1mcg/kg/hr IV infusion, titratable to reduced agitation (Goal: RASS -2)  • Ketamine   o Acute agitated delirium: 1-2 mg/kg IV or 4-5 mg/kg IM  o Refractory withdrawal: 0 1-1mg/kg/hr IV infusion, titratable to reduced agitation (Goal: RASS -2)    Further evaluation, screening and treatment:  Evaluate complete metabolic panel, transaminases, INR, and lipase  Assess hepatic ultrasound for any sign of alcoholic liver disease or cirrhosis, and ultimately refer for further hepatic evaluation and care as/if indicated  Additional medications for ethanol associated malnutrition: Thiamine 100 mg IV daily, increase to 500 mg TID for signs/symptoms of Wernicke's Encephalopathy or Wernicke Korsakoff Syndrome   Folic acid 1 mg IV daily   Multivitamin PO daily      Will offer first monthly injection of Naltrexone 380 mg IM, once patient is stabilized, as it has been shown to assist in decreasing cravings for ethanol  Evaluate and treat for coexisting substance use, such as opioids and nicotine  Discuss risk factors for infectious disease, such as history of intravenous drug abuse, and offer hepatitis and HIV screening if indicated  Case management consultation to assist with coordination of subsequent treatment after discharge  Hx and PE limited by: none    HPI: Della Reyes is a 42y o  year old male PMH AUD, HTN, anxiety/depression who presents with alcohol withdrawal  Patient originally presented to [facility] ED earlier today for evaluation of abdominal pain and N/V; also seeking alcohol detoxification  Work-up in ED revealed gastritis and alcoholic hepatitis  Patient subsequently transferred to [facility] detox unit for medical management of alcohol withdrawal  On admission, patient reports that he has been drinking alcohol consistently x 20+ yrs  Notes that he has not had any significant period of time of sobriety during that time; notes he has not stopped drinking long enough to experience withdrawal  States he has never been on medication before for alcohol use, denies past rehab admissions  Notes he has been drinking 12-13 beers daily  States he switched to wine the past few days as he could not tolerate beer  Reports that he was thinking about going to rehab on Monday somewhere near [facility], however he wanted to get his GI symptoms addressed first  Reports he has been having intermittent generalized abdominal pain (mostly epigastric and lower abdominal), nausea, and non-bloody emesis and decreased PO intake daily x about 1 month  States his symptoms are improved since coming to the ED, notes appetite is returning  Reports that he is unsure what resources he wants on discharge, notes he would be open to MAT  Preferred alcoholic beverage(s): beer, wine, fireball   Quantity and frequency of alcohol intake: 12-13 12-oz beers daily   Use of any ethanol substitutes (toxic alcohols): no  Date/Time of last alcohol intake: 3/10/2023 around 11 pm, completed 1 bottle of wine   Current signs and symptoms of ethanol withdrawal: anxiety    SEWS Total Score: 0 (3/11/2023  3:29 PM)    Ethanol Withdrawal History  Previous ethanol withdrawal? no  Prior inpatient treatment for ethanol withdrawal? no  Prior outpatient treatment for ethanol withdrawal? no  History of seizures with prior ethanol withdrawal? no  Prior treatment with naltrexone (Vivitrol)? no  Current treatment with naltrexone (Vivitrol)? no  Other current treatment for ethanol use disorder? no  Co-existing substance use? No- denies current use of marijuana, cocaine, meth, heroin   Denies h/o IVDU    Review of PDMP: yes - no records     Social History     Substance and Sexual Activity   Alcohol Use Yes   • Alcohol/week: 90 0 standard drinks   • Types: 90 Cans of beer per week     Social History     Substance and Sexual Activity   Drug Use Yes   • Frequency: 1 0 times per week   • Types: Marijuana     Social History     Tobacco Use   Smoking Status Every Day   • Packs/day: 1 00   • Years: 20 00   • Pack years: 20 00   • Types: Cigarettes   • Passive exposure: Current   Smokeless Tobacco Current   • Types: Chew       Review of Systems   Constitutional: Positive for appetite change (decreased )  Negative for chills and fever  HENT: Negative for congestion, ear pain, sneezing and sore throat  Eyes: Negative for pain and visual disturbance  Respiratory: Positive for cough (intemittent dry cough )  Negative for shortness of breath  Cardiovascular: Negative for chest pain and palpitations  Gastrointestinal: Positive for abdominal pain (intermittent; isaiah epigastric ), nausea and vomiting  Negative for blood in stool, constipation and diarrhea  Genitourinary: Negative for difficulty urinating, dysuria and hematuria  Musculoskeletal: Negative for arthralgias and back pain  Skin: Negative for color change and rash  Neurological: Negative for dizziness, tremors, seizures, syncope and headaches  Psychiatric/Behavioral: Positive for dysphoric mood  Negative for suicidal ideas  The patient is nervous/anxious  All other systems reviewed and are negative  Historical Information   Past Medical History:   Diagnosis Date   • Anxiety      History reviewed  No pertinent surgical history  History reviewed  No pertinent family history    Social History   Marital Status: /Civil Union   Occupation: unemployed   Patient Pre-hospital Living Situation: home   Patient Pre-hospital Level of Mobility: independent   Patient Pre-hospital Diet Restrictions: none    Allergies   Allergen Reactions   • Amoxicillin Rash Prior to Admission medications    Medication Sig Start Date End Date Taking? Authorizing Provider   buPROPion (WELLBUTRIN XL) 300 mg 24 hr tablet Take 300 mg by mouth daily    Historical Provider, MD       Current Facility-Administered Medications   Medication Dose Route Frequency   • aluminum-magnesium hydroxide-simethicone (MYLANTA) oral suspension 30 mL  30 mL Oral S4Q PRN   • folic acid 1 mg, thiamine (VITAMIN B1) 100 mg in sodium chloride 0 9 % 100 mL IV piggyback   Intravenous Daily   • Lidocaine Viscous HCl (XYLOCAINE) 2 % mucosal solution 15 mL  15 mL Swish & Swallow 4x Daily PRN   • nicotine (NICODERM CQ) 21 mg/24 hr TD 24 hr patch 21 mg  21 mg Transdermal Once   • [START ON 3/12/2023] nicotine (NICODERM CQ) 21 mg/24 hr TD 24 hr patch 21 mg  21 mg Transdermal Daily   • nicotine polacrilex (NICORETTE) gum 2 mg  2 mg Oral Q2H PRN   • ondansetron (ZOFRAN) injection 4 mg  4 mg Intravenous Q6H PRN   • pantoprazole (PROTONIX) injection 40 mg  40 mg Intravenous Q12H [facility]   • sodium chloride 0 9 % infusion  125 mL/hr Intravenous Continuous   • sucralfate (CARAFATE) tablet 1 g  1 g Oral 4x Daily (AC & HS)       Continuous Infusions:sodium chloride, 125 mL/hr, Last Rate: 125 mL/hr (03/11/23 1522)             Objective     No intake or output data in the 24 hours ending 03/11/23 1834    Invasive Devices:   Peripheral IV 03/11/23 Left Antecubital (Active)   Site Assessment Clean;Dry 03/11/23 1600   Dressing Type Transparent 03/11/23 1600   Line Status Flushed; Infusing 03/11/23 1600   Dressing Status Intact 03/11/23 1600       Vitals   Vitals:    03/11/23 1523   BP: 145/89   TempSrc: Temporal   Pulse: 92   Resp: 18   Patient Position - Orthostatic VS: Lying   Temp: 98 1 °F (36 7 °C)       Physical Exam  Vitals reviewed  Constitutional:       General: He is not in acute distress  Appearance: Normal appearance  He is normal weight  He is not ill-appearing or diaphoretic     HENT:      Head: Normocephalic and atraumatic  Nose: Nose normal  No congestion  Mouth/Throat:      Mouth: Mucous membranes are moist       Pharynx: Oropharynx is clear  Eyes:      General: Scleral icterus present  Extraocular Movements: Extraocular movements intact  Right eye: No nystagmus  Left eye: No nystagmus  Conjunctiva/sclera: Conjunctivae normal       Pupils: Pupils are equal, round, and reactive to light  Cardiovascular:      Rate and Rhythm: Normal rate and regular rhythm  Pulses: Normal pulses  Dorsalis pedis pulses are 2+ on the right side and 2+ on the left side  Posterior tibial pulses are 2+ on the right side and 2+ on the left side  Heart sounds: Normal heart sounds  No murmur heard  No friction rub  No gallop  Pulmonary:      Effort: Pulmonary effort is normal  No respiratory distress  Breath sounds: Normal breath sounds  No wheezing, rhonchi or rales  Abdominal:      General: Abdomen is flat  Bowel sounds are normal  There is no distension  Palpations: Abdomen is soft  Tenderness: There is generalized abdominal tenderness and tenderness in the epigastric area  There is no guarding or rebound  Musculoskeletal:         General: No swelling  Normal range of motion  Cervical back: Normal range of motion  Right lower leg: No edema  Left lower leg: No edema  Skin:     General: Skin is warm and dry  Coloration: Skin is jaundiced  Neurological:      General: No focal deficit present  Mental Status: He is alert and oriented to person, place, and time  Mental status is at baseline  Sensory: No sensory deficit  Motor: No tremor  Coordination: Finger-Nose-Finger Test normal       Comments: No asterixis  No tongue fasciculations   Psychiatric:         Attention and Perception: Attention normal  He does not perceive auditory or visual hallucinations  Mood and Affect: Mood is anxious and depressed  Speech: Speech normal          Behavior: Behavior is cooperative  Thought Content: Thought content does not include suicidal ideation  Thought content does not include suicidal plan  Data:    EKG, Pathology, and Other Studies: I have personally reviewed pertinent reports  · EKG (3/11/2023 1640): normal sinus rhythm  Ventricular rate 85 bpm  QTc 430 ms  No acute ST segment elevation/depression  · CT abd/pelvis 3/11/2023: "Hepatomegaly with significant fatty infiltration  The gallbladder is contracted with possible wall thickening  Gastric fold thickening appears be present in could be related to gastritis  Endoscopic follow-up should be considered  A few prominent small bowel loops are present without zone of transition and could be related to ileus or gastroenteritis  Possible small varices in the gastrohepatic ligament could suggest some portal hypertension   No ascites"  · CXR 3/11/2023: "No acute consolidation or congestion"    Lab Results:  CBC ETOH     Lab Results   Component Value Date    WBC 7 32 03/11/2023    RBC 3 51 (L) 03/11/2023    HGB 12 1 03/11/2023    HCT 36 4 (L) 03/11/2023     (H) 03/11/2023    MCH 34 5 (H) 03/11/2023    MCHC 33 2 03/11/2023    RDW 14 9 03/11/2023     (L) 03/11/2023    MPV 11 8 03/11/2023      No results found for: LACTICACID   CMP UA         Component Value Date/Time    K 4 1 03/11/2023 0955    CL 97 03/11/2023 0955    CO2 26 03/11/2023 0955    BUN 7 03/11/2023 0955    CREATININE 1 01 03/11/2023 0955         Component Value Date/Time    CALCIUM 8 7 03/11/2023 0955    ALKPHOS 416 (H) 03/11/2023 0955     (H) 03/11/2023 0955     (H) 03/11/2023 0955      Lab Results   Component Value Date    CLARITYU Clear 05/07/2018    COLORU Yellow 05/07/2018    SPECGRAV 1 016 05/07/2018    PHUR 6 5 05/07/2018    GLUCOSEU Negative 05/07/2018    KETONESU Negative 05/07/2018    BLOODU Negative 05/07/2018    PROTEIN UA Negative 05/07/2018    NITRITE Negative 05/07/2018    BILIRUBINUR Negative 05/07/2018    UROBILINOGEN 0 2 05/07/2018    LEUKOCYTESUR Negative 05/07/2018        Liver Function Test: ASA     Lab Results   Component Value Date    TBILI 4 29 (H) 03/11/2023    ALKPHOS 416 (H) 03/11/2023     (H) 03/11/2023     (H) 03/11/2023    TP 5 9 (L) 03/11/2023    ALB 3 2 (L) 03/11/2023      No results found for: SALICYLATE   Troponin APAP     No results found for: TROPONINI   No results found for: ACTMNPHEN   VBG HCG     No results found for: PHVEN, NMG9TXV, PO2VEN, LVX7ADE, BEVEN, A4ZNOHCRM, P5LUHUS   No results found for: HCGQUANT   ABG Urine Drug Screen     No results found for: PHART, TNX4JNJ, PO2ART, FFX2GCE, BEART, I6IZRMOHI, O2HGB, SOURC, AMY, VTAC, ACRATE, INSPIREDAIR, PEEP   No results found for: AMPMETHUR, BARBTUR, BDZUR, COCAINEUR, METHADONEUR, OPIATEUR, PCPUR, THCUR, OXYCODONEUR   Lactate INR     No results found for: LACTICACID   Lab Results   Component Value Date    INR 0 98 03/11/2023      PTT Protime     Lab Results   Component Value Date/Time    PTT 26 03/11/2023 09:55 AM        Lab Results   Component Value Date/Time    PROTIME 13 2 03/11/2023 09:55 AM              Imaging Studies: I have personally reviewed pertinent reports  Counseling / Coordination of Care  Total floor / unit time spent today 67 minutes  Greater than 50% of total time was spent with the patient and / or family counseling and / or coordination of care         Minutes of critical care time 29  -Critical care time was exclusive of separately billable procedures and teaching time    -Critical care was necessary to treat or prevent imminent or life-threatening deterioration of the following condition: CNS failure/compromise, toxidrome (ethanol withdrawal),  withdrawal  -Critical care time was spent personally by me on the following activities as well as the above as per the course and rest of chart: obtaining history from patient/surrogate, development of a treatment plan, discussions with referring provider(s), evaluation of patient's response to the treatment, examination of the patient, performing treatments and interventions, re-evaluation of the patient's condition, review of old charts, ordering/interpreting laboratory studies, ordering/interpreting of radiographic studies  ** Please Note: This note has been constructed using a voice recognition system   **

## 2023-03-11 NOTE — ASSESSMENT & PLAN NOTE
Recent Labs     03/11/23  0955   *      · Mild, in setting of chronic alcohol use   · Likely 2/2 myelosuppression  · No evidence of acute bleeding  · Initiate thiamine and folic acid  · Continue to monitor  · Encourage alcohol cessation

## 2023-03-11 NOTE — EMTALA/ACUTE CARE TRANSFER
[address] [place] [address]  Dept: [phone]      EMTALA TRANSFER CONSENT    NAME [address] [facility]                                         1985                              MRN [id number]    I have been informed of my rights regarding examination, treatment, and transfer   by Dr Dominguez Link MD    Benefits: Specialized equipment and/or services available at the receiving facility (Include comment)________________________    Risks: Potential for delay in receiving treatment, Potential deterioration of medical condition, Loss of IV, Increased discomfort during transfer      Consent for Transfer:  I acknowledge that my medical condition has been evaluated and explained to me by the emergency department physician or other qualified medical person and/or my attending physician, who has recommended that I be transferred to the service of  Accepting Physician: Dr Jose Miguel Pulliam at [address] [place] [address] [facility]  The above potential benefits of such transfer, the potential risks associated with such transfer, and the probable risks of not being transferred have been explained to me, and I fully understand them  The doctor has explained that, in my case, the benefits of transfer outweigh the risks  I agree to be transferred  I authorize the performance of emergency medical procedures and treatments upon me in both transit and upon arrival at the receiving facility  Additionally, I authorize the release of any and all medical records to the receiving facility and request they be transported with me, if possible  I understand that the safest mode of transportation during a medical emergency is an ambulance and that the Hospital advocates the use of this mode of transport   Risks of traveling to the receiving facility by car, including absence of medical control, life sustaining equipment, such as oxygen, and medical personnel has been explained to me and I fully understand them  (JIM CORRECT BOX BELOW)  [  ]  I consent to the stated transfer and to be transported by ambulance/helicopter  [  ]  I consent to the stated transfer, but refuse transportation by ambulance and accept full responsibility for my transportation by car  I understand the risks of non-ambulance transfers and I exonerate the Hospital and its staff from any deterioration in my condition that results from this refusal     X___________________________________________    DATE  23  TIME________  Signature of patient or legally responsible individual signing on patient behalf           RELATIONSHIP TO PATIENT_________________________          Provider Certification    NAME Claude Olivo                                         1985                              MRN [id number]    A medical screening exam was performed on the above named patient  Based on the examination:    Condition Necessitating Transfer The primary encounter diagnosis was Alcohol withdrawal ([facility] [address] )  A diagnosis of Elevated liver enzymes was also pertinent to this visit      Patient Condition: The patient has been stabilized such that within reasonable medical probability, no material deterioration of the patient condition or the condition of the unborn child(teresa) is likely to result from the transfer    Reason for Transfer: Level of Care needed not available at this facility    Transfer Requirements: [address]   · Space available and qualified personnel available for treatment as acknowledged by Maico Valenzuela  · Agreed to accept transfer and to provide appropriate medical treatment as acknowledged by       Dr Michael Hart  · Appropriate medical records of the examination and treatment of the patient are provided at the time of transfer   [address] _______  · Transfer will be performed by qualified personnel from    and appropriate transfer equipment as required, including the use of necessary and appropriate life support measures  Provider Certification: I have examined the patient and explained the following risks and benefits of being transferred/refusing transfer to the patient/family:  General risk, such as traffic hazards, adverse weather conditions, rough terrain or turbulence, possible failure of equipment (including vehicle or aircraft), or consequences of actions of persons outside the control of the transport personnel      Based on these reasonable risks and benefits to the patient and/or the unborn child(teresa), and based upon the information available at the time of the patient’s examination, I certify that the medical benefits reasonably to be expected from the provision of appropriate medical treatments at another medical facility outweigh the increasing risks, if any, to the individual’s medical condition, and in the case of labor to the unborn child, from effecting the transfer      X____________________________________________ DATE 03/11/23        TIME_______      ORIGINAL - SEND TO MEDICAL RECORDS   COPY - SEND WITH PATIENT DURING TRANSFER

## 2023-03-11 NOTE — ASSESSMENT & PLAN NOTE
H/o chronic daily alcohol consumption, denies h/o withdrawal seizures  · States he has not stopped drinking long enough to experience withdrawal   Last reported drink 3/10/2023 around 2300; 1 bottle of wine  Ethanol 20 (3/11/2023 0955)  Received 1 mg Ativan IV in ED PTA  Follow SEWS protocol with symptom-triggered phenobarbital for medically-assisted alcohol withdrawal  Current symptoms appear mild- states symptoms improved following ativan and   Continue to monitor vitals, symptoms and   · Telemetry and continuous pulse-ox monitoring

## 2023-03-11 NOTE — ASSESSMENT & PLAN NOTE
· H/o anxiety and depression  · Previously prescribed Wellbutrin however states he is no longer taking  · Does not currently follow with outpatient psychiatry   · Currently notes depression; denies acute SI/thoughts of self-harm  · States he would consider an SSRI, but notes he would prefer discussing with psychiatry first  · Consult psychiatry per patient's request, appreciate recommendations  · Recommend outpatient f/u PCP, psychiatry

## 2023-03-11 NOTE — ASSESSMENT & PLAN NOTE
· Reports intermittent epigastric/lower abdominal pain, anorexia, and nausea with non-bloody emesis daily x approximately 1 month   · Denies acute abdominal pain at rest currently; reports symptoms have improved since he was in the ED   · Denies diarrhea   · CT abd/pelvis 3/11/2023: "Hepatomegaly with significant fatty infiltration  The gallbladder is contracted with possible wall thickening  Gastric fold thickening appears be present in could be related to gastritis  Endoscopic follow-up should be considered  A few prominent small bowel loops are present without zone of transition and could be related to ileus or gastroenteritis  Possible small varices in the gastrohepatic ligament could suggest some portal hypertension   No ascites"  · See management of alcoholic hepatitis and alcoholic gastritis above   · Continue to monitor with supportive care

## 2023-03-11 NOTE — ASSESSMENT & PLAN NOTE
· H/o HTN  · Reports currently prescribed amlodipine 5 mg daily, but notes he has been non-compliant x 1 month  · BP on admission: 145/89  · Likely elevated in setting of acute withdrawal  · Continue to monitor vitals, BP  · Consider restarting amlodipine if BP remains elevated despite resolution of acute withdrawal   · Recommend outpatient f/u PCP

## 2023-03-11 NOTE — ASSESSMENT & PLAN NOTE
· Reports intermittent epigastric/lower abdominal pain, anorexia, and nausea with non-bloody emesis daily x approximately 1 month   · Denies acute abdominal pain at rest currently; reports symptoms have improved since he was in the ED   · CT a/p 3/11/2023: "Gastric fold thickening appears be present in could be related to gastritis    Endoscopic follow-up should be considered"  · Lipase WNL   · Initiate IV protonix 40 mg q 12, carafate  · Supportive meds including zofran, mylanta, viscous lidocaine  · Consult GI given CT findings, appreciate recommendations  · Encourage PO intake as tolerated: non-ulcerogenic diet   · Encourage alcohol cessation

## 2023-03-11 NOTE — ASSESSMENT & PLAN NOTE
· Creatinine 1 01 on admission, in setting of dehydration 2/2 decreased PO intake and vomiting   · Unclear baseline 2/2 limited historical data: most recent CMP 5/7/2018- creatinine 0 86  · Initiate IVFs  · Continue to monitor and encourage adequate PO intake

## 2023-03-11 NOTE — ASSESSMENT & PLAN NOTE
Recent Labs     03/11/23  0955   *   *   ALKPHOS 416*   TBILI 4 29*     · Reports intermittent epigastric/lower abdominal pain and nausea with non-bloody emesis daily x approximately 1 month   · Denies acute abdominal pain at rest currently; reports symptoms have improved since he was in the ED   · In setting of chronic alcohol use  · LFTs and bilirubin elevated on admission  · INR WNL   · Calculated MDF: 9 8  · CT abd/pelvis 3/11/2023: "Hepatomegaly with significant fatty infiltration   Possible small varices in the gastrohepatic ligament could suggest some portal hypertension"   · On exam: scleral icterus and jaundice noted, mild diffuse abdominal discomfort  · Continue to monitor CMP, check direct bilirubin  · Monitor with supportive care   · Consult GI given CT findings, appreciate recommendations   · Encourage alcohol cessation

## 2023-03-11 NOTE — ASSESSMENT & PLAN NOTE
· CT 3/11/2023: "Possible small varices in the gastrohepatic ligament could suggest some portal hypertension "  · In setting of chronic alcohol use   · See alcoholic hepatitis above  · GI consulted, appreciate recommendations

## 2023-03-11 NOTE — ASSESSMENT & PLAN NOTE
Patient with h/o chronic alcohol use x 20+ yrs   Currently consumes 12-13 cans of beer daily; was drinking wine the past few days as it was "all he could keep down"  Denies significant periods of sobriety  Last reported drink 3/10/2023 around 2300; 1 bottle of wine  Ethanol 20 (3/11/2023 0955)  Initiate IVFs, daily thiamine/folic acid supplementation  Has never been on MAT before but interested in discussing   · Currently not a candidate for naltrexone 2/2 LFT elevation, can consider acamprosate   Manage withdrawal as above  Consult case management for assistance with disposition planning

## 2023-03-11 NOTE — ASSESSMENT & PLAN NOTE
Recent Labs     03/11/23  0955   HGB 12 1   HCT 36 4*     · In setting of chronic alcohol use  · 2/2 myelosuppression  · No evidence of acute bleeding- denies hematemesis, melena, hematochezia  · Initiate thiamine, folic acid  · Continue to monitor  · Encourage alcohol cessation

## 2023-03-11 NOTE — ASSESSMENT & PLAN NOTE
· Sodium 133 on admission   · In setting of chronic alcohol use and decreased PO intake   · Continue to monitor   · Consider nephrology consult if sodium decreases <130  · Encourage adequate PO intake and alcohol cessation

## 2023-03-12 ENCOUNTER — APPOINTMENT (OUTPATIENT)
Dept: ULTRASOUND IMAGING | Facility: HOSPITAL | Age: 38
End: 2023-03-12

## 2023-03-12 PROBLEM — N17.9 AKI (ACUTE KIDNEY INJURY) (HCC): Status: RESOLVED | Noted: 2023-03-11 | Resolved: 2023-03-12

## 2023-03-12 LAB
ALBUMIN SERPL BCP-MCNC: 2.5 G/DL (ref 3.5–5)
ALP SERPL-CCNC: 351 U/L (ref 43–122)
ALT SERPL W P-5'-P-CCNC: 140 U/L
ANION GAP SERPL CALCULATED.3IONS-SCNC: 2 MMOL/L (ref 5–14)
AST SERPL W P-5'-P-CCNC: 280 U/L (ref 17–59)
BILIRUB DIRECT SERPL-MCNC: 2.48 MG/DL (ref 0–0.2)
BILIRUB SERPL-MCNC: 3.55 MG/DL (ref 0.2–1)
BUN SERPL-MCNC: 5 MG/DL (ref 5–25)
CALCIUM ALBUM COR SERPL-MCNC: 8.9 MG/DL (ref 8.3–10.1)
CALCIUM SERPL-MCNC: 7.7 MG/DL (ref 8.4–10.2)
CHLORIDE SERPL-SCNC: 108 MMOL/L (ref 96–108)
CO2 SERPL-SCNC: 26 MMOL/L (ref 21–32)
CREAT SERPL-MCNC: 0.86 MG/DL (ref 0.7–1.5)
ERYTHROCYTE [DISTWIDTH] IN BLOOD BY AUTOMATED COUNT: 14.3 % (ref 11.6–15.1)
GFR SERPL CREATININE-BSD FRML MDRD: 109 ML/MIN/1.73SQ M
GLUCOSE SERPL-MCNC: 87 MG/DL (ref 70–99)
HCT VFR BLD AUTO: 28.7 % (ref 36.5–49.3)
HCT VFR BLD AUTO: 29.2 % (ref 36.5–49.3)
HGB BLD-MCNC: 9.4 G/DL (ref 12–17)
HGB BLD-MCNC: 9.6 G/DL (ref 12–17)
MAGNESIUM SERPL-MCNC: 1.9 MG/DL (ref 1.6–2.3)
MCH RBC QN AUTO: 33.7 PG (ref 26.8–34.3)
MCHC RBC AUTO-ENTMCNC: 32.8 G/DL (ref 31.4–37.4)
MCV RBC AUTO: 103 FL (ref 82–98)
PLATELET # BLD AUTO: 143 THOUSANDS/UL (ref 149–390)
PMV BLD AUTO: 11.5 FL (ref 8.9–12.7)
POTASSIUM SERPL-SCNC: 3.5 MMOL/L (ref 3.5–5.3)
PROT SERPL-MCNC: 5 G/DL (ref 6.4–8.4)
RBC # BLD AUTO: 2.79 MILLION/UL (ref 3.88–5.62)
SODIUM SERPL-SCNC: 136 MMOL/L (ref 135–147)
WBC # BLD AUTO: 5.42 THOUSAND/UL (ref 4.31–10.16)

## 2023-03-12 RX ORDER — AMLODIPINE BESYLATE 5 MG/1
5 TABLET ORAL DAILY
Status: DISCONTINUED | OUTPATIENT
Start: 2023-03-12 | End: 2023-03-13 | Stop reason: HOSPADM

## 2023-03-12 RX ORDER — HYDROXYZINE 50 MG/1
50 TABLET, FILM COATED ORAL EVERY 6 HOURS PRN
Status: DISCONTINUED | OUTPATIENT
Start: 2023-03-12 | End: 2023-03-13 | Stop reason: HOSPADM

## 2023-03-12 RX ADMIN — AMLODIPINE BESYLATE 5 MG: 5 TABLET ORAL at 10:30

## 2023-03-12 RX ADMIN — SUCRALFATE 1 G: 1 TABLET ORAL at 11:21

## 2023-03-12 RX ADMIN — Medication 6 MG: at 20:49

## 2023-03-12 RX ADMIN — SUCRALFATE 1 G: 1 TABLET ORAL at 21:00

## 2023-03-12 RX ADMIN — HYDROXYZINE HYDROCHLORIDE 50 MG: 50 TABLET, FILM COATED ORAL at 21:02

## 2023-03-12 RX ADMIN — TRAZODONE HYDROCHLORIDE 50 MG: 50 TABLET ORAL at 20:49

## 2023-03-12 RX ADMIN — SUCRALFATE 1 G: 1 TABLET ORAL at 17:56

## 2023-03-12 RX ADMIN — NICOTINE POLACRILEX 2 MG: 2 GUM, CHEWING BUCCAL at 20:42

## 2023-03-12 RX ADMIN — NICOTINE 21 MG: 21 PATCH, EXTENDED RELEASE TRANSDERMAL at 08:53

## 2023-03-12 RX ADMIN — PANTOPRAZOLE SODIUM 40 MG: 40 INJECTION, POWDER, FOR SOLUTION INTRAVENOUS at 08:56

## 2023-03-12 RX ADMIN — NICOTINE POLACRILEX 2 MG: 2 GUM, CHEWING BUCCAL at 18:01

## 2023-03-12 RX ADMIN — FOLIC ACID: 5 INJECTION, SOLUTION INTRAMUSCULAR; INTRAVENOUS; SUBCUTANEOUS at 08:51

## 2023-03-12 RX ADMIN — SUCRALFATE 1 G: 1 TABLET ORAL at 06:03

## 2023-03-12 RX ADMIN — PANTOPRAZOLE SODIUM 40 MG: 40 INJECTION, POWDER, FOR SOLUTION INTRAVENOUS at 20:42

## 2023-03-12 NOTE — ASSESSMENT & PLAN NOTE
Recent Labs     03/11/23  0955 03/12/23  0456   * 143*     · Mild, in setting of chronic alcohol use; appears overall stable this AM   · Likely 2/2 myelosuppression  · No evidence of acute bleeding  · Continue thiamine and folic acid  · Continue to monitor  · Encourage alcohol cessation

## 2023-03-12 NOTE — ASSESSMENT & PLAN NOTE
· On admission: Reported intermittent epigastric/lower abdominal pain, anorexia, and nausea with non-bloody emesis daily x approximately 1 month   · Denies acute abdominal pain at rest currently; reports symptoms have improved since he was in the ED   · Denies diarrhea   · CT abd/pelvis 3/11/2023: "Hepatomegaly with significant fatty infiltration  The gallbladder is contracted with possible wall thickening  Gastric fold thickening appears be present in could be related to gastritis  Endoscopic follow-up should be considered  A few prominent small bowel loops are present without zone of transition and could be related to ileus or gastroenteritis  Possible small varices in the gastrohepatic ligament could suggest some portal hypertension   No ascites"  · Reports symptoms have subsided this AM- denies N/V currently, able to tolerate PO  · See management of alcoholic hepatitis and alcoholic gastritis above   · Continue to monitor with supportive care

## 2023-03-12 NOTE — ASSESSMENT & PLAN NOTE
· On admission: reported intermittent epigastric/lower abdominal pain, anorexia, and nausea with non-bloody emesis daily x approximately 1 month   · CT a/p 3/11/2023: "Gastric fold thickening appears be present in could be related to gastritis    Endoscopic follow-up should be considered"  · Lipase WNL   · Reports symptoms have subsided this AM- denies N/V/abdominal pain currently, able to tolerate PO  · Continue IV protonix 40 mg q 12, carafate  · Supportive meds including zofran, mylanta, viscous lidocaine  · Consult GI given CT findings, appreciate recommendations  · Recommending outpatient endoscopy at this time   · Encourage PO intake as tolerated: non-ulcerogenic diet   · Encourage alcohol cessation

## 2023-03-12 NOTE — ASSESSMENT & PLAN NOTE
Recent Labs     03/11/23  0955 03/11/23 2028 03/12/23  0456   SODIUM 133* 134* 136     · Sodium 133 on admission   · In setting of chronic alcohol use and decreased PO intake  · Improved and stable this AM   · Continue to monitor   · Consider nephrology consult if sodium decreases <130  · Encourage adequate PO intake and alcohol cessation

## 2023-03-12 NOTE — PROGRESS NOTES
23 [id number]   Referral Data   Referral Source Patient   Referral Name Self   Referral Reason Drug/Alcohol [address] [facility]   Readmission Root Cause   30 Day Readmission No   Patient Information   Mental Status Alert   Primary Caregiver Self   Accompanied by/Relationship Self   Support System Immediate family   Legal Information   Legal Issues Pt denies   Activities of Daily Living Prior to Admission   Functional Status Independent   Assistive Device No device needed   Living Arrangement House;Lives alone   Ambulation Independent   Access to Firearms   Access to Firearms No  (Pt denies)   Income [address] [employer]   Means of Transport to Appts: Drives Self     Pt's name, , address, phone number were verified by case management  Pt was informed of case management role and the purpose of completion of intake with case management  Pt was pleasant and cooperative throughout intake process  Pt reports that he has been drinking 12-13 cans of beer daily for 20+years; pt states that this is his first time in substance use treatment  Pt reports first use at age 21 and last use on 3/10/2023 of 1 bottle of wine  Pt denies any period of sobriety since he began drinking  Pt denies current withdrawal symptoms, denies history of dt's/seizures  Pt reports history of anxiety, nausea and vomiting  Pt reports family history of addiction, brother-heroin and maternal grandfather-alcohol  AUDIT: No score   PAWSS: 3  UDS (+) for: Not completed   Ethanol level in ED: 0 20    Pt reports history of JOVANY and MDD; pt states that he was prescribed Wellbutrin by his PCP but stopped taking it because "it wasn't working"  Pt denies any previous or current engagement in mental health treatment  Pt denies SI/HI; denies history of abuse; denies access to firearms   Pt denies family history of mental health diagnoses but states that his mother completed suicide by SIGSW  Pt has HTN and alcoholic gastritis; pt verified PCP as Dr Billy Estrada but declined to sign JACKSON  Pt reports that he does not have insurance  Pt does not have preferred pharmacy  Pt denies any legal issues  Pt reports that he completed 12th grade; pt states that he is unemployed and has been since January 2023  Pt denies receiving any government assistance  Pt reports that he has a car/license and transports self  Pt denies any  service  Pt reports that he is  and lives alone; can return home upon discharge  Pt states that his main support is his father, Sharmin Swenson but declined to sign any ROIs for supportive contacts  Pt and CM completed relapse prevention plan  Pt and CM signed plan and pt received a copy of this plan  Pt identified symptoms of depression as his main reason for drinking, states that he drinks to "feel happy" but is unwilling to engage in any mental health treatment or consider psychiatric medication to help with these symptoms  Pt states that he believes he can stop drinking on his own despite not being able to identify any coping skills he can utilize to prevent relapse  Pt stated that he had arranged to go to [facility] for rehab however, he stated that he changed his mind and believes all he needs is assistance with detox  Pt indicated that he would like to go home and he will follow up with rehab if needed upon discharge  Pt requested that CM contact [facility] and inform them of pt's decision not to enter treatment at their facility; CM spoke with Dallas Bravo at [facility] and informed her of this  CM informed pt that substance use treatment resources will be placed on pt's AVS in the event that he changes his mind  Pt indicates a desire to return home upon discharge; pt is declining any assistance with substance use treatment follow up   CM's clinical impression is that pt may significantly benefit from dual diagnosis treatment to address chronic substance use and mental health needs; it appears pt lacks insight around his alcohol use and the impact this has on his mental health  Pt presents in the contemplation stage of change

## 2023-03-12 NOTE — ASSESSMENT & PLAN NOTE
· CT 3/11/2023: "Possible small varices in the gastrohepatic ligament could suggest some portal hypertension "  · In setting of chronic alcohol use   · See alcoholic hepatitis above  · GI consulted, appreciate recommendations   · Recommending outpatient endoscopy at this time

## 2023-03-12 NOTE — ASSESSMENT & PLAN NOTE
Patient with h/o chronic alcohol use x 20+ yrs   Currently consumes 12-13 cans of beer daily; was drinking wine the past few days as it was "all he could keep down"  Denies significant periods of sobriety  Last reported drink 3/10/2023 around 2300; 1 bottle of wine  Ethanol 20 (3/11/2023 0955)  continue daily thiamine/folic acid supplementation  Has never been on MAT before but interested in discussing   · Currently not a candidate for naltrexone 2/2 LFT elevation, can consider acamprosate   Manage withdrawal as above  Consult case management for assistance with disposition planning

## 2023-03-12 NOTE — CONSULTS
Consultation - [facility]  Ronald Fisher 42 y o  male MRN: [id number]  Unit/Bed#: 5T DETOX 510-01 Encounter: [id number]        ASSESSMENT/PLAN:   Ronald Fisher is a 42 y o  male with a history of alcohol abuse for 2 decades, with significant alcohol use in the past month also with nonbloody nonbilious emesis and nausea as well as some abdominal discomfort, now GI consulted for elevated LFTs and gastric thickening on CT scan  Suspect that elevated LFTs are from acute alcohol hepatitis  They are currently improving  Gastric thickening likely related to alcohol use and gastritis  CT AP with hepatomegaly, thickened gallbladder, gastric fold thickening, few prominent small bowel loops, possible small varices in the gastropathic ligament  Ultrasound with hepatic steatosis and hepatomegaly but no suggestion of cirrhosis  AST now 280, , alk phos 351, albumin 2 5, total bilirubin 3 55  All of these have been improving  Creatinine 0 86  Direct bilirubin 2 48  CBC notable for white blood cell count of 5 42, hemoglobin 9 4, , platelet count of 106  INR 0 98  MELD-Na score: 11 at 3/12/2023  4:56 AM  MELD score: 11 at 3/12/2023  4:56 AM  Calculated from:  Serum Creatinine: 0 86 mg/dL (Using min of 1 mg/dL) at 3/12/2023  4:56 AM  Serum Sodium: 136 mmol/L at 3/12/2023  4:56 AM  Total Bilirubin: 3 55 mg/dL at 3/12/2023  4:56 AM  INR(ratio): 0 98 (Using min of 1) at 3/11/2023  9:55 AM  Age: 37 years    Patient's [facility] discriminant function is: 13 7 which indicates low short-term mortality and treatment with glucocorticoids is not indicated  Suspect elevated LFTs are related to acute alcoholic hepatitis, now improving  Suspect hemoglobin drop likely dilutional from IV fluids, and in addition, his initial hemoglobin may have been hemoconcentrated as he has not been eating much for the past month and had only been mainly drinking      Continue PPI daily  Continue Carafate as needed  Can plan for outpatient EGD to evaluate for varices as well as gastric thickening  Thiamine  Folate  Alcohol cessation  Continue to trend liver numbers  High-protein diet  Trend CBC  Maintain active type and screen and IV access  Trend LFTs  Trend INR  Trend Cr  Will send viral hepatitis studies and H pylori stool antigen    Inpatient consult to gastroenterology  Consult performed by: Justino Silva MD  Consult ordered by: Rafael Bowens PA-C          Reason for Consult / Principal Problem: Alcoholic hepatitis without ascites    HPI: Carmela Copeland is a 42y o  year old male with history of alcohol abuse, GI consulted for Alcoholic hepatitis without ascites  The patient reports that for the past month he has been drinking consistently with minimal p o  intake  He has been drinking alcohol for the past 20+ years as a whole, but it has been worse in the past month with at least 12-13 beers daily, occasionally also with wine, with increased drinking on the weekends  He reports that he was having some lower abdominal discomfort  He was having nausea and vomiting at home  He reports the vomiting was clear and there was no blood or black vomit  He denies any melena or hematochezia  He still has some abdominal discomfort but overall is feeling better  He is able to tolerate food well    Review of Systems: as per HPI  10 point ROS reviewed and negative, except as above    Historical Information   Past Medical History:   Diagnosis Date   • Anxiety      History reviewed  No pertinent surgical history    Social History   Social History     Substance and Sexual Activity   Alcohol Use Yes   • Alcohol/week: 90 0 standard drinks   • Types: 90 Cans of beer per week     Social History     Substance and Sexual Activity   Drug Use Yes   • Frequency: 1 0 times per week   • Types: Marijuana     Social History     Tobacco Use   Smoking Status Every Day   • Packs/day: 1 00   • Years: 20 00   • Pack years: 20 00 • Types: Cigarettes   • Passive exposure: Current   Smokeless Tobacco Current   • Types: Chew     History reviewed  No pertinent family history  Meds/Allergies     Medications Prior to Admission   Medication   • buPROPion (WELLBUTRIN XL) 300 mg 24 hr tablet     Current Facility-Administered Medications   Medication Dose Route Frequency   • aluminum-magnesium hydroxide-simethicone (MYLANTA) oral suspension 30 mL  30 mL Oral Q6H PRN   • amLODIPine (NORVASC) tablet 5 mg  5 mg Oral Daily   • folic acid 1 mg, thiamine (VITAMIN B1) 100 mg in sodium chloride 0 9 % 100 mL IV piggyback   Intravenous Daily   • Lidocaine Viscous HCl (XYLOCAINE) 2 % mucosal solution 15 mL  15 mL Swish & Swallow 4x Daily PRN   • melatonin tablet 6 mg  6 mg Oral HS PRN   • nicotine (NICODERM CQ) 21 mg/24 hr TD 24 hr patch 21 mg  21 mg Transdermal Once   • nicotine (NICODERM CQ) 21 mg/24 hr TD 24 hr patch 21 mg  21 mg Transdermal Daily   • nicotine polacrilex (NICORETTE) gum 2 mg  2 mg Oral Q2H PRN   • ondansetron (ZOFRAN) injection 4 mg  4 mg Intravenous Q6H PRN   • pantoprazole (PROTONIX) injection 40 mg  40 mg Intravenous Q12H NICOLE   • sucralfate (CARAFATE) tablet 1 g  1 g Oral 4x Daily (AC & HS)   • traZODone (DESYREL) tablet 50 mg  50 mg Oral HS PRN       Allergies   Allergen Reactions   • Amoxicillin Rash       Objective     Blood pressure 138/87, pulse 94, temperature 98 8 °F (37 1 °C), temperature source Temporal, resp  rate 16, height 5' 8" (1 727 m), weight 64 1 kg (141 lb 6 4 oz), SpO2 98 %  No intake or output data in the 24 hours ending 03/12/23 1252    PHYSICAL EXAMINATION:    General Appearance:   Alert, cooperative, no distress   HEENT:  Normocephalic, atraumatic  Neck supple, symmetrical, trachea midline  + Icterus   Lungs:   Equal chest rise and unlabored breathing, normal effort, no coughing  Cardiovascular:   No visualized JVD  Abdomen:   No abdominal distension  Skin:   No jaundice, rashes, or lesions  Musculoskeletal:   Normal range of motion visualized  Psych:  Normal affect and normal insight  Neuro:  Alert and appropriate         Lab Results:   CBC:   Lab Results   Component Value Date    WBC 5 42 03/12/2023    HGB 9 4 (L) 03/12/2023    HCT 28 7 (L) 03/12/2023     (H) 03/12/2023     (L) 03/12/2023    MCH 33 7 03/12/2023    MCHC 32 8 03/12/2023    RDW 14 3 03/12/2023    MPV 11 5 03/12/2023   ,   CMP:   Lab Results   Component Value Date    K 3 5 03/12/2023     03/12/2023    CO2 26 03/12/2023    BUN 5 03/12/2023    CREATININE 0 86 03/12/2023    CALCIUM 7 7 (L) 03/12/2023     (H) 03/12/2023     (H) 03/12/2023    ALKPHOS 351 (H) 03/12/2023    EGFR 109 03/12/2023   ,   Lipase: No results found for: LIPASE,  PT/INR: No results found for: PT, INR,   Troponin: No results found for: TROPONINI,   Magnesium: No components found for: MAG,   Phosphorous: No results found for: PHOS  Imaging Studies: I have personally reviewed pertinent films in PACS

## 2023-03-12 NOTE — PROGRESS NOTES
[facility]  Progress Note - Erik Cartwright 1985, 42 y o  male MRN: [id number]  Unit/Bed#: 5T DETOX 510-01 Encounter: [id number]  Primary Care Provider: Yolie Reyes MD   Date and time admitted to hospital: 3/11/2023  3:07 PM    MEDICAL DETOX UNIT, LEVEL 4  Department of Medical Toxicology  Reason for Admission/Principal Problem: alcohol withdrawal   Rounding Provider: Keke Ledesma PA-C, Heaven Hayes, DO     Alcohol withdrawal syndrome with complication [facility])  Assessment & Plan  H/o chronic daily alcohol consumption, denies h/o withdrawal seizures  · States he has not stopped drinking long enough to experience withdrawal   Last reported drink 3/10/2023 around 2300; 1 bottle of wine  Ethanol 20 (3/11/2023 0955)  Received 1 mg Ativan IV in ED PTA  Continue to follow SEWS protocol with symptom-triggered phenobarbital for medically-assisted alcohol withdrawal  Received 64 8 mg phenobarbital thus far, last dose administered 3/11/2023 2009  Current symptoms appear mild- VSS other than underlying HTN (resuming home amlodipine); no tremors  Continue to monitor vitals, symptoms and administer additional phenobarbital as indicated; can possibly come off SEWS later today   · Telemetry and continuous pulse-ox monitoring     * Alcoholic hepatitis without ascites  Assessment & Plan  Recent Labs     03/11/23  0955 03/12/23  0456   * 280*   * 140*   ALKPHOS 416* 351*   TBILI 4 29* 3 55*   · On admission: reported intermittent epigastric/lower abdominal pain and nausea with non-bloody emesis daily x approximately 1 month    · On exam: scleral icterus and jaundice noted  · In setting of chronic alcohol use  · LFTs and bilirubin elevated on admission; improved this AM   · Direct bili 2 48  · INR WNL   · Calculated MDF: 9 8 -> 9 1  · CT abd/pelvis 3/11/2023: "Hepatomegaly with significant fatty infiltration   Possible small varices in the gastrohepatic ligament could suggest some portal hypertension"   · RUQ U/S 3/12/2023: "Hepatic steatosis and hepatomegaly  No surface nodularity to suggest cirrhosis "  · Continue to monitor CMP  · Monitor with supportive care   · Consult GI given CT findings, appreciate recommendations   · Encourage alcohol cessation     Alcoholic gastritis  Assessment & Plan  · On admission: reported intermittent epigastric/lower abdominal pain, anorexia, and nausea with non-bloody emesis daily x approximately 1 month   · CT a/p 3/11/2023: "Gastric fold thickening appears be present in could be related to gastritis  Endoscopic follow-up should be considered"  · Lipase WNL   · Reports symptoms have subsided this AM- denies N/V/abdominal pain currently, able to tolerate PO  · Continue IV protonix 40 mg q 12, carafate  · Supportive meds including zofran, mylanta, viscous lidocaine  · Consult GI given CT findings, appreciate recommendations  · Recommending outpatient endoscopy at this time   · Encourage PO intake as tolerated: non-ulcerogenic diet   · Encourage alcohol cessation     EDWIN (acute kidney injury) (HCC)-resolved as of 3/12/2023  Assessment & Plan  Recent Labs     03/11/23  0955 03/11/23 2028 03/12/23  0456   CREATININE 1 01 0 91 0 86      · Creatinine 1 01 on admission, in setting of dehydration 2/2 decreased PO intake and vomiting   · Unclear baseline 2/2 limited historical data: most recent CMP 5/7/2018- creatinine 0 86  · Improved with IVFs  · Continue to monitor and encourage adequate PO intake     Nausea and vomiting-resolved as of 3/12/2023  Assessment & Plan  · On admission: Reported intermittent epigastric/lower abdominal pain, anorexia, and nausea with non-bloody emesis daily x approximately 1 month   · Denies acute abdominal pain at rest currently; reports symptoms have improved since he was in the ED   · Denies diarrhea   · CT abd/pelvis 3/11/2023: "Hepatomegaly with significant fatty infiltration   The gallbladder is contracted with possible wall thickening  Gastric fold thickening appears be present in could be related to gastritis  Endoscopic follow-up should be considered  A few prominent small bowel loops are present without zone of transition and could be related to ileus or gastroenteritis  Possible small varices in the gastrohepatic ligament could suggest some portal hypertension   No ascites"  · Reports symptoms have subsided this AM- denies N/V currently, able to tolerate PO  · See management of alcoholic hepatitis and alcoholic gastritis above   · Continue to monitor with supportive care     Thrombocytopenia [facility])  Assessment & Plan  Recent Labs     03/11/23  0955 03/12/23  0456   * 143*     · Mild, in setting of chronic alcohol use; appears overall stable this AM   · Likely 2/2 myelosuppression  · No evidence of acute bleeding  · Continue thiamine and folic acid  · Continue to monitor  · Encourage alcohol cessation     Macrocytic anemia  Assessment & Plan  Recent Labs     03/11/23  0955 03/12/23  0456   HGB 12 1 9 4*   HCT 36 4* 28 7*   * 103*     · In setting of chronic alcohol use; likely 2/2 myelosuppression  · Decrease in hgb noted this morning- potentially dilutional as decrease seen in all cell lines   · Discussed with GI- also suspect dilutional   · No evidence of acute bleeding- continues to deny hematemesis, melena, hematochezia  · Continue to monitor: check heme-occult and repeat H&H this afternoon to ensure stability  · Continue thiamine, folic acid  · Encourage alcohol cessation     Hyponatremia  Assessment & Plan  Recent Labs     03/11/23  0955 03/11/23 2028 03/12/23  0456   SODIUM 133* 134* 136     · Sodium 133 on admission   · In setting of chronic alcohol use and decreased PO intake  · Improved and stable this AM   · Continue to monitor   · Consider nephrology consult if sodium decreases <130  · Encourage adequate PO intake and alcohol cessation     HTN (hypertension)  Assessment & Plan  · H/o HTN  · Reports currently prescribed amlodipine 5 mg daily, but notes he has been non-compliant x 1 month  · Most recent /94; preceding 24 hr range 123//103  · Will resume home amolidipine  · Continue to monitor vitals, BP  · Recommend outpatient f/u PCP    Alcohol use disorder, severe, dependence ([facility] [address] )  Assessment & Plan  Patient with h/o chronic alcohol use x 20+ yrs   Currently consumes 12-13 cans of beer daily; was drinking wine the past few days as it was "all he could keep down"  Denies significant periods of sobriety  Last reported drink 3/10/2023 around 2300; 1 bottle of wine  Ethanol 20 (3/11/2023 0955)  continue daily thiamine/folic acid supplementation  Has never been on MAT before but interested in discussing   · Currently not a candidate for naltrexone 2/2 LFT elevation, can consider acamprosate   Manage withdrawal as above  Consult case management for assistance with disposition planning    Abnormal CT of the abdomen  Assessment & Plan  · CT 3/11/2023: "Possible small varices in the gastrohepatic ligament could suggest some portal hypertension "  · In setting of chronic alcohol use   · See alcoholic hepatitis above  · GI consulted, appreciate recommendations   · Recommending outpatient endoscopy at this time    Cigarette nicotine dependence without complication  Assessment & Plan  Current every day smoker: 1-2 ppd  Encourage cessation  Offer nicotine replacement    Anxiety and depression  Assessment & Plan  · H/o anxiety and depression  · Previously prescribed Wellbutrin however states he is no longer taking  · Does not currently follow with outpatient psychiatry   · Currently notes depression; denies acute SI/thoughts of self-harm  · States he would consider an SSRI, but notes he would prefer discussing with psychiatry first  · Consult psychiatry per patient's request, appreciate recommendations  · Recommend outpatient f/u PCP, psychiatry    VTE Pharmacologic Prophylaxis:   Pharmacologic: Pharmacologic VTE Prophylaxis contraindicated due to anemia; low risk    Mechanical VTE Prophylaxis in Place: yes    Code Status: Level 1 - Full Code    Patient Centered Rounds: I have performed bedside rounds with nursing staff today  Discussions with Specialists or Other Care Team Provider: Dr Boogie Aleman CM, GI    Education and Discussions with Family / Patient: I personally did not discuss patient with family at this time  Discussed current plan with patient, answered all questions to best of my ability  Time Spent for Care: 30 minutes  More than 50% of total time spent on counseling and coordination of care as described above  Current Length of Stay: 1 day(s)    Current Patient Status: Inpatient     Certification Statement: The patient will continue to require additional inpatient hospital stay due to ongoing monitoring of alcohol withdrawal, hgb, LFTs Discharge Plan: home once medically stable- anticipate 24-48 hrs       Total time spent today 30 minutes  Greater than 50% of total time was spent with the patient and / or family counseling and / or coordination of care  A description of the counseling / coordination of care: AUD, alcoholic hepatitis and gastritis, anemia     Subjective:   Patient seen and examined bedside this morning  Reports that he is feeling much better today compared to yesterday, appetite has improved and N/V/abdominal pain has subsided  Additionally, denies hematemesis, melena, hematochezia  Currently denies headaches, lightheadedness/dizziness, coughing/sneezing/congestion/rhinorrhea, chest pain, SOB/dyspnea, constipation/diarrhea, dysuria/hematuria, hallucinations       Objective:     Clinical Opiate Withdrawal Scale  Pulse: 85    SEWS Total Score: 0 (3/12/2023  9:01 AM)        Last 24 Hours Medication List:   Current Facility-Administered Medications   Medication Dose Route Frequency Provider Last Rate   • aluminum-magnesium hydroxide-simethicone  30 mL Oral Q6H PRN Belle Reyes PA-C • amLODIPine  5 mg Oral Daily Belle Reyes PA-C     • folic acid 1 mg, thiamine 100 mg in 0 9% sodium chloride 100 mL IVPB   Intravenous Daily Belle Reyes PA-C     • Lidocaine Viscous HCl  15 mL Swish & Swallow 4x Daily PRN Belle Reyes PA-C     • melatonin  6 mg Oral HS PRN Taylor Braden PA-C     • nicotine  21 mg Transdermal Once Belle Reyes PA-C     • nicotine  21 mg Transdermal Daily Belle Reyes PA-C     • nicotine polacrilex  2 mg Oral Q2H PRN Belle Reyes PA-C     • ondansetron  4 mg Intravenous Q6H PRN Belle Reyes PA-C     • pantoprazole  40 mg Intravenous Q12H [address] Belle Reyes PA-C     • sucralfate  1 g Oral 4x Daily (AC & HS) Belle Reyes PA-C     • traZODone  50 mg Oral HS PRN Taylor Braden PA-C           Vitals:   Temp (24hrs), Av 6 °F (37 °C), Min:98 °F (36 7 °C), Max:99 °F (37 2 °C)    Temp:  [98 °F (36 7 °C)-99 °F (37 2 °C)] 99 °F (37 2 °C)  HR:  [] 85  Resp:  [16-19] 16  BP: (123-145)/(83-99) 145/94  SpO2:  [98 %-100 %] 98 %  Body mass index is 21 5 kg/m²  Input and Output Summary (last 24 hours):No intake or output data in the 24 hours ending 23 1051    Physical Exam:   Physical Exam  Vitals and nursing note reviewed  Constitutional:       General: He is not in acute distress  Appearance: Normal appearance  He is well-developed and normal weight  He is not ill-appearing or diaphoretic  HENT:      Head: Normocephalic and atraumatic  Eyes:      General: Scleral icterus present  Extraocular Movements: Extraocular movements intact  Conjunctiva/sclera: Conjunctivae normal       Pupils: Pupils are equal, round, and reactive to light  Cardiovascular:      Rate and Rhythm: Normal rate and regular rhythm  Pulses: Normal pulses  Dorsalis pedis pulses are 2+ on the right side and 2+ on the left side          Posterior tibial pulses are 2+ on the right side and 2+ on the left side  Heart sounds: Normal heart sounds  No murmur heard  No friction rub  No gallop  Pulmonary:      Effort: Pulmonary effort is normal  No respiratory distress  Breath sounds: Normal breath sounds  No wheezing, rhonchi or rales  Abdominal:      General: Abdomen is flat  Bowel sounds are normal  There is no distension  Palpations: Abdomen is soft  Tenderness: There is no abdominal tenderness  There is no guarding  Musculoskeletal:         General: No swelling  Normal range of motion  Cervical back: Normal range of motion  Right lower leg: No edema  Left lower leg: No edema  Skin:     General: Skin is warm and dry  Coloration: Skin is jaundiced  Neurological:      General: No focal deficit present  Mental Status: He is alert and oriented to person, place, and time  Mental status is at baseline  Sensory: No sensory deficit  Motor: No tremor  Coordination: Finger-Nose-Finger Test normal       Comments: No asterixis, no tremors    Psychiatric:         Attention and Perception: Attention normal          Mood and Affect: Mood normal          Speech: Speech normal          Behavior: Behavior is cooperative           Additional Data:     Labs: keep all most recent labs as listed on admission templates   Results from last 7 days   Lab Units 03/12/23 0456 03/11/23  0955   WBC Thousand/uL 5 42 7 32   HEMOGLOBIN g/dL 9 4* 12 1   HEMATOCRIT % 28 7* 36 4*   PLATELETS Thousands/uL 143* 148*   NEUTROS PCT %  --  57   LYMPHS PCT %  --  36   MONOS PCT %  --  6   EOS PCT %  --  0      Results from last 7 days   Lab Units 03/12/23  0456   SODIUM mmol/L 136   POTASSIUM mmol/L 3 5   CHLORIDE mmol/L 108   CO2 mmol/L 26   BUN mg/dL 5   CREATININE mg/dL 0 86   ANION GAP mmol/L 2*   CALCIUM mg/dL 7 7*   ALBUMIN g/dL 2 5*   TOTAL BILIRUBIN mg/dL 3 55*   ALK PHOS U/L 351*   ALT U/L 140*   AST U/L 280*   GLUCOSE RANDOM mg/dL 87      Results from last 7 days   Lab Units 03/11/23  0955   INR  0 98          * I Have Reviewed All Lab Data Listed Above  * Additional Pertinent Lab Tests Reviewed: All Labs Within Last 24 Hours Reviewed      Imaging Studies: I have personally reviewed pertinent reports  Recent Cultures (last 7 days): Today, Patient Was Seen By: Oz Pickett PA-C    ** Please Note: Dictation voice to text software may have been used in the creation of this document   **

## 2023-03-12 NOTE — PLAN OF CARE
Problem: PAIN - ADULT  Goal: Verbalizes/displays adequate comfort level or baseline comfort level  Description: Interventions:  - Encourage patient to monitor pain and request assistance  - Assess pain using appropriate pain scale  - Administer analgesics based on type and severity of pain and evaluate response  - Implement non-pharmacological measures as appropriate and evaluate response  - Consider cultural and social influences on pain and pain management  - Notify physician/advanced practitioner if interventions unsuccessful or patient reports new pain  Outcome: Progressing     Problem: INFECTION - ADULT  Goal: Absence or prevention of progression during hospitalization  Description: INTERVENTIONS:  - Assess and monitor for signs and symptoms of infection  - Monitor lab/diagnostic results  - Monitor all insertion sites, i e  indwelling lines, tubes, and drains  - Monitor endotracheal if appropriate and nasal secretions for changes in amount and color  - [place] appropriate cooling/warming therapies per order  - Administer medications as ordered  - Instruct and encourage patient and family to use good hand hygiene technique  - Identify and instruct in appropriate isolation precautions for identified infection/condition  Outcome: Progressing  Goal: Absence of fever/infection during neutropenic period  Description: INTERVENTIONS:  - Monitor WBC    Outcome: Progressing     Problem: SAFETY ADULT  Goal: Patient will remain free of falls  Description: INTERVENTIONS:  - Educate patient/family on patient safety including physical limitations  - Instruct patient to call for assistance with activity   - Consult OT/PT to assist with strengthening/mobility   - Keep Call bell within reach  - Keep bed low and locked with side rails adjusted as appropriate  - Keep care items and personal belongings within reach  - Initiate and maintain comfort rounds  - Make Fall Risk Sign visible to staff  - Apply yellow socks and bracelet for high fall risk patients  - Consider moving patient to room near nurses station  Outcome: Progressing  Goal: Maintain or return to baseline ADL function  Description: INTERVENTIONS:  -  Assess patient's ability to carry out ADLs; assess patient's baseline for ADL function and identify physical deficits which impact ability to perform ADLs (bathing, care of mouth/teeth, toileting, grooming, dressing, etc )  - Assess/evaluate cause of self-care deficits   - Assess range of motion  - Assess patient's mobility; develop plan if impaired  - Assess patient's need for assistive devices and provide as appropriate  - Encourage maximum independence but intervene and supervise when necessary  - Involve family in performance of ADLs  - Assess for home care needs following discharge   - Consider OT consult to assist with ADL evaluation and planning for discharge  - Provide patient education as appropriate  Outcome: Progressing  Goal: Maintains/Returns to pre admission functional level  Description: INTERVENTIONS:  - Perform BMAT or MOVE assessment daily    - Set and communicate daily mobility goal to care team and patient/family/caregiver     - Collaborate with rehabilitation services on mobility goals if consulted  - Out of bed for toileting  - Record patient progress and toleration of activity level   Outcome: Progressing     Problem: DISCHARGE PLANNING  Goal: Discharge to home or other facility with appropriate resources  Description: INTERVENTIONS:  - Identify barriers to discharge w/patient and caregiver  - Arrange for needed discharge resources and transportation as appropriate  - Identify discharge learning needs (meds, wound care, etc )  - Arrange for interpretive services to assist at discharge as needed  - Refer to Case Management Department for coordinating discharge planning if the patient needs post-hospital services based on physician/advanced practitioner order or complex needs related to functional status, cognitive ability, or social support system  Outcome: Progressing     Problem: Knowledge Deficit  Goal: Patient/family/caregiver demonstrates understanding of disease process, treatment plan, medications, and discharge instructions  Description: Complete learning assessment and assess knowledge base  Interventions:  - Provide teaching at level of understanding  - Provide teaching via preferred learning methods  Outcome: Progressing     Problem: Nutrition/Hydration-ADULT  Goal: Nutrient/Hydration intake appropriate for improving, restoring or maintaining nutritional needs  Description: Monitor and assess patient's nutrition/hydration status for malnutrition  Collaborate with interdisciplinary team and initiate plan and interventions as ordered  Monitor patient's weight and dietary intake as ordered or per policy  Utilize nutrition screening tool and intervene as necessary  Determine patient's food preferences and provide high-protein, high-caloric foods as appropriate       INTERVENTIONS:  - Monitor oral intake, urinary output, labs, and treatment plans  - Assess nutrition and hydration status and recommend course of action  - Evaluate amount of meals eaten  - Assist patient with eating if necessary   - Allow adequate time for meals  - Recommend/ encourage appropriate diets, oral nutritional supplements, and vitamin/mineral supplements  - Order, calculate, and assess calorie counts as needed  - Recommend, monitor, and adjust tube feedings and TPN/PPN based on assessed needs  - Assess need for intravenous fluids  - Provide specific nutrition/hydration education as appropriate  - Include patient/family/caregiver in decisions related to nutrition  Outcome: Progressing

## 2023-03-12 NOTE — ASSESSMENT & PLAN NOTE
Recent Labs     03/11/23  0955 03/11/23 2028 03/12/23  0456   CREATININE 1 01 0 91 0 86      · Creatinine 1 01 on admission, in setting of dehydration 2/2 decreased PO intake and vomiting   · Unclear baseline 2/2 limited historical data: most recent CMP 5/7/2018- creatinine 0 86  · Improved with IVFs  · Continue to monitor and encourage adequate PO intake

## 2023-03-12 NOTE — ASSESSMENT & PLAN NOTE
H/o chronic daily alcohol consumption, denies h/o withdrawal seizures  · States he has not stopped drinking long enough to experience withdrawal   Last reported drink 3/10/2023 around 2300; 1 bottle of wine  Ethanol 20 (3/11/2023 0955)  Received 1 mg Ativan IV in ED PTA  Continue to follow SEWS protocol with symptom-triggered phenobarbital for medically-assisted alcohol withdrawal  Received 64 8 mg phenobarbital thus far, last dose administered 3/11/2023 2009  Current symptoms appear mild- VSS other than underlying HTN (resuming home amlodipine); no tremors  Continue to monitor vitals, symptoms and administer additional phenobarbital as indicated; can possibly come off SEWS later today   · Telemetry and continuous pulse-ox monitoring

## 2023-03-12 NOTE — QUICK NOTE
Rita Martinez is a 26-year-old overtly appearing male with a past medical history of macrocytic anemia, hypertension with past psychiatric history of alcohol use disorder, nicotine dependence and anxiety and depression admitted to [address] [place] [address] on 3/11/23 for alcohol withdrawal syndrome with complication  Psychiatry was consulted for medication recommendations  Upon conversation, patient just needed some clarification about when to see a psychiatrist and how to get an appointment in the outpatient setting  Provided with recommendations and inbox message sent to outpatient office       Discussed with treatment team, consult discontinued, resources added to DC summary for patient for therapy and med management

## 2023-03-12 NOTE — ASSESSMENT & PLAN NOTE
· H/o HTN  · Reports currently prescribed amlodipine 5 mg daily, but notes he has been non-compliant x 1 month  · Most recent /94; preceding 24 hr range 123//103  · Will resume home amolidipine  · Continue to monitor vitals, BP  · Recommend outpatient f/u PCP

## 2023-03-12 NOTE — DISCHARGE INSTR - OTHER ORDERS
Drug and Alcohol Resources in [facility]    If you have health insurance, including medical assistance, there should be a phone number on your insurance card that you can call to find out how to access services  The card may say, “For [address] or “For Drug and Alcohol Services” or “For Substance Abuse Services” call the number provided  [facility]  Miguel Noland [address]  [phone]  A  is available Monday through Friday from 8:00 am to 5:00 pm to provide you with assistance on accessing services for substance abuse  If you do not have health insurance and are in financial need, this office may also help you get the funding for the services that are necessary  [address] [facility] provides funding to support three Recovery Centers in [facility]  These centers offer a safe, sober environment to those in recovery  A variety of programming including 12-Step Meetings, Sedrick Hernandez, Life Skills Workshops, etc  is offered at each location  [address]  [phone]  www  cleanslatebangor  org [address]  Alexander Amaro, [address] [facility]  [phone]  mtaieq-mr-osed  Pb Morales [address], [address] [facility]  [phone]   [address] [place] [address]  [phone]  www  kimberlyroshan  [facility], [address] [place] [address]  [phone]  [facility]  [facility]  Confidential free help, from public health agencies, to find substance use treatment and information  [phone]    Link for Zoom Codes for Virtual 12 step Meetings  [place]  aspx  Alcoholics Anonymous  [facility]  [phone]    http://www chakraborty com/  Narcotics Anonymous  932.181.7204  https://Autobook Now/    Mental health resources in [facility]    [address] [facility], [address]  Phone: [phone]    Crisis Intervention number: 1-548-180-5978    Prevent suicide PA: In Crisis?  Call    2-746-116-NDJL (3003)    National Suicide Prevention Lifeline: 6-679-231-202.103.1184    Khloe Cole 98:   [address] [facility], [address] [place] [address] [address]  [address] [facility], [address] [facility]  [address] [facility], [address]  [address]  [address] [facility] , [address] [facility], [address]  [address]  [address], [address]  [place], 0 [facility]  [phone]

## 2023-03-12 NOTE — ASSESSMENT & PLAN NOTE
Recent Labs     03/11/23  0955 03/12/23  0456   * 280*   * 140*   ALKPHOS 416* 351*   TBILI 4 29* 3 55*   · On admission: reported intermittent epigastric/lower abdominal pain and nausea with non-bloody emesis daily x approximately 1 month    · On exam: scleral icterus and jaundice noted  · In setting of chronic alcohol use  · LFTs and bilirubin elevated on admission; improved this AM   · Direct bili 2 48  · INR WNL   · Calculated MDF: 9 8 -> 9 1  · CT abd/pelvis 3/11/2023: "Hepatomegaly with significant fatty infiltration  Possible small varices in the gastrohepatic ligament could suggest some portal hypertension"   · RUQ U/S 3/12/2023: "Hepatic steatosis and hepatomegaly   No surface nodularity to suggest cirrhosis "  · Continue to monitor CMP  · Monitor with supportive care   · Consult GI given CT findings, appreciate recommendations   · Encourage alcohol cessation

## 2023-03-12 NOTE — ASSESSMENT & PLAN NOTE
Recent Labs     03/11/23  0955 03/12/23  0456   HGB 12 1 9 4*   HCT 36 4* 28 7*   * 103*     · In setting of chronic alcohol use; likely 2/2 myelosuppression  · Decrease in hgb noted this morning- potentially dilutional as decrease seen in all cell lines   · Discussed with GI- also suspect dilutional   · No evidence of acute bleeding- continues to deny hematemesis, melena, hematochezia  · Continue to monitor: check heme-occult and repeat H&H this afternoon to ensure stability  · Continue thiamine, folic acid  · Encourage alcohol cessation

## 2023-03-13 ENCOUNTER — TELEPHONE (OUTPATIENT)
Dept: PSYCHIATRY | Facility: CLINIC | Age: 38
End: 2023-03-13

## 2023-03-13 VITALS
TEMPERATURE: 98.3 F | OXYGEN SATURATION: 97 % | HEART RATE: 83 BPM | DIASTOLIC BLOOD PRESSURE: 90 MMHG | SYSTOLIC BLOOD PRESSURE: 132 MMHG | BODY MASS INDEX: 21.43 KG/M2 | HEIGHT: 68 IN | WEIGHT: 141.4 LBS | RESPIRATION RATE: 15 BRPM

## 2023-03-13 PROBLEM — E87.1 HYPONATREMIA: Status: RESOLVED | Noted: 2023-03-11 | Resolved: 2023-03-13

## 2023-03-13 PROBLEM — F10.939 ALCOHOL WITHDRAWAL SYNDROME WITH COMPLICATION (HCC): Status: RESOLVED | Noted: 2023-03-11 | Resolved: 2023-03-13

## 2023-03-13 LAB
ALBUMIN SERPL BCP-MCNC: 3 G/DL (ref 3.5–5)
ALP SERPL-CCNC: 351 U/L (ref 43–122)
ALT SERPL W P-5'-P-CCNC: 144 U/L
ANION GAP SERPL CALCULATED.3IONS-SCNC: 2 MMOL/L (ref 5–14)
AST SERPL W P-5'-P-CCNC: 237 U/L (ref 17–59)
ATRIAL RATE: 85 BPM
BILIRUB SERPL-MCNC: 4.24 MG/DL (ref 0.2–1)
BUN SERPL-MCNC: 3 MG/DL (ref 5–25)
CALCIUM ALBUM COR SERPL-MCNC: 9.2 MG/DL (ref 8.3–10.1)
CALCIUM SERPL-MCNC: 8.4 MG/DL (ref 8.4–10.2)
CHLORIDE SERPL-SCNC: 109 MMOL/L (ref 96–108)
CO2 SERPL-SCNC: 28 MMOL/L (ref 21–32)
CREAT SERPL-MCNC: 0.87 MG/DL (ref 0.7–1.5)
ERYTHROCYTE [DISTWIDTH] IN BLOOD BY AUTOMATED COUNT: 14.6 % (ref 11.6–15.1)
GFR SERPL CREATININE-BSD FRML MDRD: 108 ML/MIN/1.73SQ M
GLUCOSE SERPL-MCNC: 84 MG/DL (ref 70–99)
HAV IGM SER QL: NORMAL
HBV CORE IGM SER QL: NORMAL
HBV SURFACE AG SER QL: NORMAL
HCT VFR BLD AUTO: 32.5 % (ref 36.5–49.3)
HCV AB SER QL: NORMAL
HGB BLD-MCNC: 10.5 G/DL (ref 12–17)
MCH RBC QN AUTO: 34.4 PG (ref 26.8–34.3)
MCHC RBC AUTO-ENTMCNC: 32.3 G/DL (ref 31.4–37.4)
MCV RBC AUTO: 107 FL (ref 82–98)
P AXIS: 57 DEGREES
PLATELET # BLD AUTO: 166 THOUSANDS/UL (ref 149–390)
PMV BLD AUTO: 11.5 FL (ref 8.9–12.7)
POTASSIUM SERPL-SCNC: 3.6 MMOL/L (ref 3.5–5.3)
PR INTERVAL: 124 MS
PROT SERPL-MCNC: 5.7 G/DL (ref 6.4–8.4)
QRS AXIS: 56 DEGREES
QRSD INTERVAL: 80 MS
QT INTERVAL: 362 MS
QTC INTERVAL: 430 MS
RBC # BLD AUTO: 3.05 MILLION/UL (ref 3.88–5.62)
SODIUM SERPL-SCNC: 139 MMOL/L (ref 135–147)
T WAVE AXIS: 45 DEGREES
VENTRICULAR RATE: 85 BPM
WBC # BLD AUTO: 6.27 THOUSAND/UL (ref 4.31–10.16)

## 2023-03-13 RX ORDER — PANTOPRAZOLE SODIUM 40 MG/1
40 TABLET, DELAYED RELEASE ORAL
Status: DISCONTINUED | OUTPATIENT
Start: 2023-03-14 | End: 2023-03-13 | Stop reason: HOSPADM

## 2023-03-13 RX ORDER — LANOLIN ALCOHOL/MO/W.PET/CERES
100 CREAM (GRAM) TOPICAL DAILY
Status: DISCONTINUED | OUTPATIENT
Start: 2023-03-14 | End: 2023-03-13 | Stop reason: HOSPADM

## 2023-03-13 RX ORDER — PANTOPRAZOLE SODIUM 40 MG/1
40 TABLET, DELAYED RELEASE ORAL
Qty: 30 TABLET | Refills: 0 | Status: CANCELLED | OUTPATIENT
Start: 2023-03-14

## 2023-03-13 RX ORDER — FOLIC ACID 1 MG/1
1 TABLET ORAL DAILY
Status: DISCONTINUED | OUTPATIENT
Start: 2023-03-14 | End: 2023-03-13 | Stop reason: HOSPADM

## 2023-03-13 RX ORDER — PANTOPRAZOLE SODIUM 40 MG/1
40 TABLET, DELAYED RELEASE ORAL
Qty: 30 TABLET | Refills: 0 | Status: SHIPPED | OUTPATIENT
Start: 2023-03-14

## 2023-03-13 RX ORDER — LANOLIN ALCOHOL/MO/W.PET/CERES
100 CREAM (GRAM) TOPICAL DAILY
Refills: 0
Start: 2023-03-14

## 2023-03-13 RX ORDER — AMLODIPINE BESYLATE 5 MG/1
5 TABLET ORAL DAILY
Refills: 0
Start: 2023-03-14

## 2023-03-13 RX ORDER — FOLIC ACID 1 MG/1
1 TABLET ORAL DAILY
Refills: 0
Start: 2023-03-14

## 2023-03-13 RX ADMIN — HYDROXYZINE HYDROCHLORIDE 50 MG: 50 TABLET, FILM COATED ORAL at 08:02

## 2023-03-13 RX ADMIN — AMLODIPINE BESYLATE 5 MG: 5 TABLET ORAL at 08:02

## 2023-03-13 RX ADMIN — PANTOPRAZOLE SODIUM 40 MG: 40 INJECTION, POWDER, FOR SOLUTION INTRAVENOUS at 08:02

## 2023-03-13 RX ADMIN — FOLIC ACID: 5 INJECTION, SOLUTION INTRAMUSCULAR; INTRAVENOUS; SUBCUTANEOUS at 08:03

## 2023-03-13 RX ADMIN — SUCRALFATE 1 G: 1 TABLET ORAL at 11:40

## 2023-03-13 RX ADMIN — NICOTINE 21 MG: 21 PATCH, EXTENDED RELEASE TRANSDERMAL at 08:02

## 2023-03-13 RX ADMIN — SUCRALFATE 1 G: 1 TABLET ORAL at 06:00

## 2023-03-13 NOTE — ASSESSMENT & PLAN NOTE
· H/o HTN  · Reports currently prescribed amlodipine 5 mg daily, but notes he has been non-compliant x 1 month  · Most recent /90; preceding 24 hr range 135//94  · Continue home amlodipine   · Recommend outpatient f/u PCP

## 2023-03-13 NOTE — ASSESSMENT & PLAN NOTE
· CT 3/11/2023: "Possible small varices in the gastrohepatic ligament could suggest some portal hypertension "  · In setting of chronic alcohol use   · See alcoholic hepatitis above  · GI consulted, appreciate recommendations   · Recommending outpatient endoscopy at this time  · Encourage alcohol cessation

## 2023-03-13 NOTE — ASSESSMENT & PLAN NOTE
· H/o anxiety and depression  · Previously prescribed Wellbutrin however states he is no longer taking  · Does not currently follow with outpatient psychiatry   · Currently notes depression; denies acute SI/thoughts of self-harm  · Declines medications at this time   · Recommend outpatient f/u PCP, psychiatry

## 2023-03-13 NOTE — ASSESSMENT & PLAN NOTE
· On admission: reported intermittent epigastric/lower abdominal pain, anorexia, and nausea with non-bloody emesis daily x approximately 1 month   · CT a/p 3/11/2023: "Gastric fold thickening appears be present in could be related to gastritis    Endoscopic follow-up should be considered"  · Lipase WNL   · Continues to deny symptoms this AM- denies N/V/abdominal pain currently, able to tolerate PO  · Continue protonix 40 mg daily on discharge   · Consult GI given CT findings, appreciate recommendations  · Recommending outpatient f/u with endoscopy at this time   · Encourage PO intake as tolerated: non-ulcerogenic diet   · Encourage alcohol cessation

## 2023-03-13 NOTE — DISCHARGE SUMMARY
MEDICAL DETOX UNIT, LEVEL 4  Department of Medical Toxicology  Reason for Admission/Principal Problem: ETOH withdrawal, ETOH use disorder   Admitting provider: Vernon Favre, PA-C Elige Grooms, MD   3/11/2023  3:07 PM       Discharging Physician / Practitioner: Vernon Favre, PA-C  PCP: Beena Bethea MD  Admission Date:   Admission Orders (From admission, onward)     Ordered        03/11/23 1510  Inpatient Admission  Once                      Discharge Date: 03/13/23    Medical Problems     Resolved Problems  Date Reviewed: 3/11/2023          Resolved    Alcohol withdrawal syndrome with complication ([facility] [address] ) [id number]/36/9346     Resolved by  [place] Reyes PA-C    Hyponatremia 3/13/2023     Resolved by  [place] Reyes PA-C    EDWIN (acute kidney injury) ([facility] [address] ) 3/12/2023     Resolved by  [place]in Reyes PA-C    Nausea and vomiting 3/12/2023     Resolved by  [place] Reyes PA-C          Alcohol withdrawal syndrome with complication (HCC)-resolved as of 3/13/2023  Assessment & Plan  H/o chronic daily alcohol consumption, denies h/o withdrawal seizures  · States he has not stopped drinking long enough to experience withdrawal   Last reported drink 3/10/2023 around 2300; 1 bottle of wine  Ethanol 20 (3/11/2023 0955)  Received 1 mg Ativan IV in ED PTA  SEWS protocol with symptom-triggered phenobarbital followed for medically-assisted alcohol withdrawal  Patient exhibited mild withdrawal during admission- suspect worst of withdrawal occurred PTA  Received 64 8 mg phenobarbital total, last dose administered 3/11/2023 2009  Appears stable off phenobarbital- VSS, no tremors   · Acute withdrawal resolved     * Alcoholic hepatitis without ascites  Assessment & Plan  Recent Labs     03/11/23  0955 03/12/23  0456 03/13/23  0534   * 280* 237*   * 140* 144*   ALKPHOS 416* 351* 351*   TBILI 4 29* 3 55* 4 24*   · On admission: reported intermittent epigastric/lower abdominal pain and nausea with non-bloody emesis daily x approximately 1 month    · On exam: scleral icterus and jaundice noted  · In setting of chronic alcohol use  · LFTs and bilirubin elevated on admission; improved this AM (bilirubin elevated)  · Direct bili 2 48  · INR WNL   · No indication for steroids at this time based on calculated MDF (9 8)  · CT abd/pelvis 3/11/2023: "Hepatomegaly with significant fatty infiltration  Possible small varices in the gastrohepatic ligament could suggest some portal hypertension"   · RUQ U/S 3/12/2023: "Hepatic steatosis and hepatomegaly  No surface nodularity to suggest cirrhosis "  · Reports symptoms have resolved- denies N/V/abdominal, tolerating PO intake   · Consult GI given CT findings, appreciate recommendations   · Discussed with GI this AM- patient acceptable for discharge today with outpatient f/u  · Provided with lab script for outpatient CMP- stressed importance of f/u  · Referral provided for outpatient GI f/u  · Encourage alcohol cessation     Alcoholic gastritis  Assessment & Plan  · On admission: reported intermittent epigastric/lower abdominal pain, anorexia, and nausea with non-bloody emesis daily x approximately 1 month   · CT a/p 3/11/2023: "Gastric fold thickening appears be present in could be related to gastritis    Endoscopic follow-up should be considered"  · Lipase WNL   · Continues to deny symptoms this AM- denies N/V/abdominal pain currently, able to tolerate PO  · Continue protonix 40 mg daily on discharge   · Consult GI given CT findings, appreciate recommendations  · Recommending outpatient f/u with endoscopy at this time   · Encourage PO intake as tolerated: non-ulcerogenic diet   · Encourage alcohol cessation     EDWIN (acute kidney injury) (HCC)-resolved as of 3/12/2023  Assessment & Plan  Recent Labs     03/11/23 2028 03/12/23  0456 03/13/23  0534   CREATININE 0 91 0 86 0 87      · Creatinine 1 01 on admission, in setting of dehydration 2/2 decreased PO intake and vomiting · Unclear baseline 2/2 limited historical data: most recent CMP 5/7/2018- creatinine 0 86  · Improved with IVFs  · Resolved  · Encourage adequate PO intake and hydration     Nausea and vomiting-resolved as of 3/12/2023  Assessment & Plan  · On admission: Reported intermittent epigastric/lower abdominal pain, anorexia, and nausea with non-bloody emesis daily x approximately 1 month   · Denies acute abdominal pain at rest currently; reports symptoms have improved since he was in the ED   · Denies diarrhea   · CT abd/pelvis 3/11/2023: "Hepatomegaly with significant fatty infiltration  The gallbladder is contracted with possible wall thickening  Gastric fold thickening appears be present in could be related to gastritis  Endoscopic follow-up should be considered  A few prominent small bowel loops are present without zone of transition and could be related to ileus or gastroenteritis  Possible small varices in the gastrohepatic ligament could suggest some portal hypertension  No ascites"  · Reports symptoms have resolved this AM- denies N/V currently, able to tolerate PO  · See management of alcoholic hepatitis and alcoholic gastritis above   · Encourage alcohol cessation     Thrombocytopenia [facility])  Assessment & Plan  Recent Labs     03/11/23  0955 03/12/23  0456 03/13/23  0539   * 143* 166     · Mild, in setting of chronic alcohol use; improved and stable this AM   · Likely 2/2 myelosuppression  · No evidence of acute bleeding  · Continue thiamine and folic acid  · Encourage alcohol cessation     Macrocytic anemia  Assessment & Plan  Recent Labs     03/11/23  0955 03/12/23  0456 03/12/23  1253 03/13/23  0539   HGB 12 1 9 4*   < > 10 5*   HCT 36 4* 28 7*   < > 32 5*   * 103*  --  107*    < > = values in this interval not displayed       · In setting of chronic alcohol use; likely 2/2 myelosuppression  · hgb improved and stable this AM  · No evidence of acute bleeding- continues to deny hematemesis, melena, hematochezia  · Routine outpatient monitoring   · Continue thiamine, folic acid  · Encourage alcohol cessation     Hyponatremia-resolved as of 3/13/2023  Assessment & Plan  Recent Labs     03/11/23 2028 03/12/23  0456 03/13/23  0534   SODIUM 134* 136 139     · Sodium 133 on admission   · In setting of chronic alcohol use and decreased PO intake  · Improved and stable this AM   · Routine outpatient monitoring   · Encourage adequate PO intake and alcohol cessation     HTN (hypertension)  Assessment & Plan  · H/o HTN  · Reports currently prescribed amlodipine 5 mg daily, but notes he has been non-compliant x 1 month  · Most recent /90; preceding 24 hr range 135//94  · Continue home amlodipine   · Recommend outpatient f/u PCP    Al[facility] use [facility], severe, dependence [facility]  Assessment & Plan  Patient with h/o chronic alcohol use x 20+ yrs   Currently consumes 12-13 cans of beer daily; was drinking wine the past few days as it was "all he could keep down"  Denies significant periods of sobriety  Last reported drink 3/10/2023 around 2300; 1 bottle of wine  Ethanol 20 (3/11/2023 0955)  continue daily thiamine/folic acid supplementation  Has never been on MAT before but interested in discussing   · Currently not a candidate for naltrexone 2/2 LFT elevation, can consider acamprosate however patient is without insurance at this time   Manage withdrawal as above  Consult case management for assistance with disposition planning- provided with outpatient resources     Abnormal CT of the abdomen  Assessment & Plan  · CT 3/11/2023: "Possible small varices in the gastrohepatic ligament could suggest some portal hypertension "  · In setting of chronic alcohol use   · See alcoholic hepatitis above  · GI consulted, appreciate recommendations   · Recommending outpatient endoscopy at this time  · Encourage alcohol cessation    Cigarette nicotine dependence without complication  Assessment & Plan  Current every day smoker: 1-2 ppd  Encourage cessation  Offer nicotine replacement    Anxiety and depression  Assessment & Plan  · H/o anxiety and depression  · Previously prescribed Wellbutrin however states he is no longer taking  · Does not currently follow with outpatient psychiatry   · Currently notes depression; denies acute SI/thoughts of self-harm  · Declines medications at this time   · Recommend outpatient f/u PCP, psychiatry      Consultations During Hospital Stay:  · Case management   · Gastroenterology     Procedures Performed:   · none    Significant Findings / Test Results:   · Elevated LFTs - downtrending  · CT AP - hepatomegaly, thickened gallbladder, gastric fold thickening, few prominent small bowel loops, possible small varices in the gastropathic ligament  · EDWIN - resolved   · Thrombocytopenia - stable   · Macrocytic anemia  · Hyponatremia - improved     Incidental Findings:   · none     Test Results Pending at Discharge (will require follow up):   · H pylori stool      Outpatient Tests Requested:  · EGD with GI     Complications:  none    Reason for Admission: alcohol withdrawal    Hospital Course:     Rik Jacobson is a 42 y o  male patient PMH AUD, HTN, anxiety/depression who originally presented to the hospital on 3/11/2023 due to alcohol withdrawal  Patient initially presented to the [facility] ED 3/11/2023 requesting detoxification from alcohol  Patient was admitted to the [facility] medical detox unit under [facility] protocol for medically assisted alcohol withdrawal and received a total of 65 mg phenobarbital without complication  Patient's alcohol withdrawal symptoms subsequently resolved, and he remained without objective evidence of alcohol withdrawal  During this hospitalization, patient was found to have alcoholic hepatitis and alcoholic gastritis, as well as CT finding suggesting possible gastric varices, for which GI was consulted and recommended outpatient f/u EGD   Discussed with GI on day of discharge; per GI, patient okay for discharge as long as he obtains f/u CMP within 1 week  Case management was consulted for assistance with aftercare resources, and patient was discharged with necessary referral to GI for continued management, and outpatient D&A resources  Please see above list of diagnoses and related plan for additional information  Condition at Discharge: good     Discharge Day Visit / Exam:     Subjective:  Patient seen and examined bedside this morning  Reports that he is feeling good this AM  Reports he is pleased with the improvement of his appetite  Currently denies headaches, lightheadedness/dizziness, coughing/sneezing/congestion/rhinorrhea, chest pain, SOB/dyspnea, abdominal pain, N/V/C/D, dysuria/hematuria, hallucinations  Interested in naltrexone, but understands liver enzymes are too elevated at this time to start  Notes he currently does note have insurance  Agreeable to f/u with PCP, also understands importance of obtaining follow up labs and following up with GI  Vitals: Blood Pressure: 132/90 (03/13/23 0715)  Pulse: 83 (03/13/23 0715)  Temperature: 98 3 °F (36 8 °C) (03/13/23 0715)  Temp Source: Temporal (03/13/23 0715)  Respirations: 15 (03/13/23 0715)  Height: 5' 8" (172 7 cm) (03/11/23 1515)  Weight - Scale: 64 1 kg (141 lb 6 4 oz) (03/11/23 1515)  SpO2: 97 % (03/13/23 0715)  Exam:   Physical Exam  Vitals and nursing note reviewed  Constitutional:       General: He is not in acute distress  Appearance: Normal appearance  He is well-developed and normal weight  He is not ill-appearing or diaphoretic  HENT:      Head: Normocephalic and atraumatic  Eyes:      General: Scleral icterus present  Extraocular Movements: Extraocular movements intact  Conjunctiva/sclera: Conjunctivae normal       Pupils: Pupils are equal, round, and reactive to light  Cardiovascular:      Rate and Rhythm: Normal rate and regular rhythm        Pulses:           Dorsalis pedis pulses are 2+ on the right side and 2+ on the left side  Posterior tibial pulses are 2+ on the right side and 2+ on the left side  Heart sounds: Normal heart sounds  No murmur heard  No friction rub  No gallop  Pulmonary:      Effort: Pulmonary effort is normal  No respiratory distress  Breath sounds: Normal breath sounds  No wheezing, rhonchi or rales  Abdominal:      General: Abdomen is flat  Bowel sounds are normal  There is no distension  Palpations: Abdomen is soft  Tenderness: There is no abdominal tenderness  There is no guarding  Musculoskeletal:         General: No swelling  Normal range of motion  Cervical back: Normal range of motion  Right lower leg: No edema  Left lower leg: No edema  Skin:     General: Skin is warm and dry  Capillary Refill: Capillary refill takes less than 2 seconds  Coloration: Skin is jaundiced  Neurological:      General: No focal deficit present  Mental Status: He is alert and oriented to person, place, and time  Mental status is at baseline  Motor: No tremor  Coordination: Finger-Nose-Finger Test normal       Gait: Gait normal    Psychiatric:         Attention and Perception: Attention normal          Mood and Affect: Mood normal          Speech: Speech normal          Behavior: Behavior is cooperative  Thought Content: Thought content does not include suicidal ideation  Thought content does not include suicidal plan  Discussion with Family: I personally did not discuss patient with family at this time  Discussed current plan with patient, answered all questions to best of my ability  Discharge instructions/Information to patient and family:   See after visit summary for information provided to patient and family  Provisions for Follow-Up Care:  See after visit summary for information related to follow-up care and any pertinent home health orders        Disposition:     Home    For Discharges to [facility]:   · Not Applicable to this Patient - Not Applicable to this Patient    Planned Readmission: NA     Discharge Statement:  I spent 36 minutes discharging the patient  This time was spent on the day of discharge  I had direct contact with the patient on the day of discharge  Greater than 50% of the total time was spent examining patient, answering all patient questions, arranging and discussing plan of care with patient as well as directly providing post-discharge instructions  Additional time then spent on discharge activities  Discharge Medications:  See after visit summary for reconciled discharge medications provided to patient and family        ** Please Note: This note has been constructed using a voice recognition system **

## 2023-03-13 NOTE — ASSESSMENT & PLAN NOTE
· On admission: Reported intermittent epigastric/lower abdominal pain, anorexia, and nausea with non-bloody emesis daily x approximately 1 month   · Denies acute abdominal pain at rest currently; reports symptoms have improved since he was in the ED   · Denies diarrhea   · CT abd/pelvis 3/11/2023: "Hepatomegaly with significant fatty infiltration  The gallbladder is contracted with possible wall thickening  Gastric fold thickening appears be present in could be related to gastritis  Endoscopic follow-up should be considered  A few prominent small bowel loops are present without zone of transition and could be related to ileus or gastroenteritis  Possible small varices in the gastrohepatic ligament could suggest some portal hypertension   No ascites"  · Reports symptoms have resolved this AM- denies N/V currently, able to tolerate PO  · See management of alcoholic hepatitis and alcoholic gastritis above   · Encourage alcohol cessation

## 2023-03-13 NOTE — ASSESSMENT & PLAN NOTE
H/o chronic daily alcohol consumption, denies h/o withdrawal seizures  · States he has not stopped drinking long enough to experience withdrawal   Last reported drink 3/10/2023 around 2300; 1 bottle of wine  Ethanol 20 (3/11/2023 0955)  Received 1 mg Ativan IV in ED PTA  SEWS protocol with symptom-triggered phenobarbital followed for medically-assisted alcohol withdrawal  Patient exhibited mild withdrawal during admission- suspect worst of withdrawal occurred PTA  Received 64 8 mg phenobarbital total, last dose administered 3/11/2023 2009  Appears stable off phenobarbital- VSS, no tremors   · Acute withdrawal resolved

## 2023-03-13 NOTE — PLAN OF CARE
Problem: PAIN - ADULT  Goal: Verbalizes/displays adequate comfort level or baseline comfort level  Description: Interventions:  - Encourage patient to monitor pain and request assistance  - Assess pain using appropriate pain scale  - Administer analgesics based on type and severity of pain and evaluate response  - Implement non-pharmacological measures as appropriate and evaluate response  - Consider cultural and social influences on pain and pain management  - Notify physician/advanced practitioner if interventions unsuccessful or patient reports new pain  Outcome: Progressing     Problem: INFECTION - ADULT  Goal: Absence or prevention of progression during hospitalization  Description: INTERVENTIONS:  - Assess and monitor for signs and symptoms of infection  - Monitor lab/diagnostic results  - Monitor all insertion sites, i e  indwelling lines, tubes, and drains  - Monitor endotracheal if appropriate and nasal secretions for changes in amount and color  - Stitzer appropriate cooling/warming therapies per order  - Administer medications as ordered  - Instruct and encourage patient and family to use good hand hygiene technique  - Identify and instruct in appropriate isolation precautions for identified infection/condition  Outcome: Progressing  Goal: Absence of fever/infection during neutropenic period  Description: INTERVENTIONS:  - Monitor WBC    Outcome: Progressing     Problem: SAFETY ADULT  Goal: Patient will remain free of falls  Description: INTERVENTIONS:  - Educate patient/family on patient safety including physical limitations  - Instruct patient to call for assistance with activity   - Consult OT/PT to assist with strengthening/mobility   - Keep Call bell within reach  - Keep bed low and locked with side rails adjusted as appropriate  - Keep care items and personal belongings within reach  - Initiate and maintain comfort rounds  - Make Fall Risk Sign visible to staff  - Apply yellow socks and bracelet for high fall risk patients  - Consider moving patient to room near nurses station  Outcome: Progressing  Goal: Maintain or return to baseline ADL function  Description: INTERVENTIONS:  -  Assess patient's ability to carry out ADLs; assess patient's baseline for ADL function and identify physical deficits which impact ability to perform ADLs (bathing, care of mouth/teeth, toileting, grooming, dressing, etc )  - Assess/evaluate cause of self-care deficits   - Assess range of motion  - Assess patient's mobility; develop plan if impaired  - Assess patient's need for assistive devices and provide as appropriate  - Encourage maximum independence but intervene and supervise when necessary  - Involve family in performance of ADLs  - Assess for home care needs following discharge   - Consider OT consult to assist with ADL evaluation and planning for discharge  - Provide patient education as appropriate  Outcome: Progressing  Goal: Maintains/Returns to pre admission functional level  Description: INTERVENTIONS:  - Perform BMAT or MOVE assessment daily    - Set and communicate daily mobility goal to care team and patient/family/caregiver     - Collaborate with rehabilitation services on mobility goals if consulted  - Out of bed for toileting  - Record patient progress and toleration of activity level   Outcome: Progressing     Problem: DISCHARGE PLANNING  Goal: Discharge to home or other facility with appropriate resources  Description: INTERVENTIONS:  - Identify barriers to discharge w/patient and caregiver  - Arrange for needed discharge resources and transportation as appropriate  - Identify discharge learning needs (meds, wound care, etc )  - Arrange for interpretive services to assist at discharge as needed  - Refer to Case Management Department for coordinating discharge planning if the patient needs post-hospital services based on physician/advanced practitioner order or complex needs related to functional status, cognitive ability, or social support system  Outcome: Progressing     Problem: Knowledge Deficit  Goal: Patient/family/caregiver demonstrates understanding of disease process, treatment plan, medications, and discharge instructions  Description: Complete learning assessment and assess knowledge base  Interventions:  - Provide teaching at level of understanding  - Provide teaching via preferred learning methods  Outcome: Progressing     Problem: Nutrition/Hydration-ADULT  Goal: Nutrient/Hydration intake appropriate for improving, restoring or maintaining nutritional needs  Description: Monitor and assess patient's nutrition/hydration status for malnutrition  Collaborate with interdisciplinary team and initiate plan and interventions as ordered  Monitor patient's weight and dietary intake as ordered or per policy  Utilize nutrition screening tool and intervene as necessary  Determine patient's food preferences and provide high-protein, high-caloric foods as appropriate       INTERVENTIONS:  - Monitor oral intake, urinary output, labs, and treatment plans  - Assess nutrition and hydration status and recommend course of action  - Evaluate amount of meals eaten  - Assist patient with eating if necessary   - Allow adequate time for meals  - Recommend/ encourage appropriate diets, oral nutritional supplements, and vitamin/mineral supplements  - Order, calculate, and assess calorie counts as needed  - Recommend, monitor, and adjust tube feedings and TPN/PPN based on assessed needs  - Assess need for intravenous fluids  - Provide specific nutrition/hydration education as appropriate  - Include patient/family/caregiver in decisions related to nutrition  Outcome: Progressing

## 2023-03-13 NOTE — ASSESSMENT & PLAN NOTE
Patient with h/o chronic alcohol use x 20+ yrs   Currently consumes 12-13 cans of beer daily; was drinking wine the past few days as it was "all he could keep down"  Denies significant periods of sobriety  Last reported drink 3/10/2023 around 2300; 1 bottle of wine  Ethanol 20 (3/11/2023 0955)  continue daily thiamine/folic acid supplementation  Has never been on MAT before but interested in discussing   · Currently not a candidate for naltrexone 2/2 LFT elevation, can consider acamprosate however patient is without insurance at this time   Manage withdrawal as above  Consult case management for assistance with disposition planning- provided with outpatient resources

## 2023-03-13 NOTE — CASE MANAGEMENT
Case Management Discharge Planning Note    Patient name Colin [facility]  Location 5T DETOX 510/5T DETOX 46* MRN [id number]  : 1985 Date 3/13/2023       Current Admission Date: 3/11/2023  Current Admission Diagnosis:Alcoholic hepatitis without ascites   Patient Active Problem List    Diagnosis Date Noted   • Alcohol use disorder, severe, dependence ([facility] [address] )    • Alcoholic gastritis    • Alcoholic hepatitis without ascites 2023   • Hyponatremia 2023   • HTN (hypertension) 2023   • Anxiety and depression    • Macrocytic anemia    • Thrombocytopenia (HCC)    • Cigarette nicotine dependence without complication    • Abnormal CT of the abdomen       LOS (days): 2  Geometric Mean LOS (GMLOS) (days):   Days to GMLOS:     OBJECTIVE:  Risk of Unplanned Readmission Score: 10 9         Current admission status: Inpatient   Preferred Pharmacy:   [id number] [address]  [address] [address]  Phone: [phone] Fax: [phone]    Primary Care Provider: Emily Khan MD    Primary Insurance:   Secondary Insurance:     DISCHARGE DETAILS:  Cm informed by medical provider pt ready for discharge today  CM met with pt and pt declined all outpatient coordination by cm and stated he only wanted resources placed on AVS  Cm placed resources on AVS  Pt stated he would either receive transportation home from family or call himself a car service  Pt will be discharged home today      Discharge planning discussed with[de-identified] patient  Freedom of Choice: Yes                                  Other Referral/Resources/Interventions Provided:  Referrals Provided[de-identified] Support Group, Therapist, IOP, Crisis Hotline    Would you like to participate in our [address] service program?  : No - Declined                                              Family notified[de-identified] no supportive JACKSON's signed

## 2023-03-13 NOTE — ASSESSMENT & PLAN NOTE
Recent Labs     03/11/23  0955 03/12/23  0456 03/13/23  0534   * 280* 237*   * 140* 144*   ALKPHOS 416* 351* 351*   TBILI 4 29* 3 55* 4 24*   · On admission: reported intermittent epigastric/lower abdominal pain and nausea with non-bloody emesis daily x approximately 1 month    · On exam: scleral icterus and jaundice noted  · In setting of chronic alcohol use  · LFTs and bilirubin elevated on admission; improved this AM (bilirubin elevated)  · Direct bili 2 48  · INR WNL   · No indication for steroids at this time based on calculated MDF (9 8)  · CT abd/pelvis 3/11/2023: "Hepatomegaly with significant fatty infiltration  Possible small varices in the gastrohepatic ligament could suggest some portal hypertension"   · RUQ U/S 3/12/2023: "Hepatic steatosis and hepatomegaly   No surface nodularity to suggest cirrhosis "  · Reports symptoms have resolved- denies N/V/abdominal, tolerating PO intake   · Consult GI given CT findings, appreciate recommendations   · Discussed with GI this AM- patient acceptable for discharge today with outpatient f/u  · Provided with lab script for outpatient CMP- stressed importance of f/u  · Referral provided for outpatient GI f/u  · Encourage alcohol cessation

## 2023-03-13 NOTE — ASSESSMENT & PLAN NOTE
Recent Labs     03/11/23 2028 03/12/23  0456 03/13/23  0534   CREATININE 0 91 0 86 0 87      · Creatinine 1 01 on admission, in setting of dehydration 2/2 decreased PO intake and vomiting   · Unclear baseline 2/2 limited historical data: most recent CMP 5/7/2018- creatinine 0 86  · Improved with IVFs  · Resolved  · Encourage adequate PO intake and hydration

## 2023-03-13 NOTE — ASSESSMENT & PLAN NOTE
Recent Labs     03/11/23  0955 03/12/23  0456 03/13/23  0539   * 143* 166     · Mild, in setting of chronic alcohol use; improved and stable this AM   · Likely 2/2 myelosuppression  · No evidence of acute bleeding  · Continue thiamine and folic acid  · Encourage alcohol cessation

## 2023-03-13 NOTE — NURSING NOTE
Pt verbalized understanding of discharge instructions  All belongings returned to pt  Pt escorted to main entrance via PCA

## 2023-03-13 NOTE — UTILIZATION REVIEW
Initial Clinical Review    Admission: Date/Time/Statement:   Admission Orders (From admission, onward)     Ordered        03/11/23 1510  Inpatient Admission  Once                      Orders Placed This Encounter   Procedures   • Inpatient Admission     Standing Status:   Standing     Number of Occurrences:   1     Order Specific Question:   Level of Care     Answer:   Med Surg [16]     Order Specific Question:   Estimated length of stay     Answer:   More than 2 Midnights     Order Specific Question:   Certification     Answer:   I certify that inpatient services are medically necessary for this patient for a duration of greater than two midnights  See H&P and MD Progress Notes for additional information about the patient's course of treatment  Initial Presentation: 42 y o  male PMH AUD, HTN, anxiety/depression who presents with alcohol withdrawal  Patient originally presented to [facility] ED earlier today for evaluation of abdominal pain and N/V; also seeking alcohol detoxification  Work-up in ED revealed gastritis and alcoholic hepatitis  Patient subsequently transferred to [facility] detox unit for medical management of alcohol withdrawal  On admission, patient reports that he has been drinking alcohol consistently x 20+ yrs  Notes that he has not had any significant period of time of sobriety during that time; notes he has not stopped drinking long enough to experience withdrawal  States he has never been on medication before for alcohol use, denies past rehab admissions  Notes he has been drinking 12-13 beers daily  States he switched to wine the past few days as he could not tolerate beer  Reports that he was thinking about going to rehab on Monday somewhere near [facility], however he wanted to get his GI symptoms addressed first  Reports he has been having intermittent generalized abdominal pain, nausea, and non-bloody emesis and decreased PO intake daily x about 1 month   Reports that he is unsure what resources he wants on discharge, notes he would be open to MAT       Preferred alcoholic beverage(s): beer, wine, fireball   Quantity and frequency of alcohol intake: 12-13 12-oz beers daily   Use of any ethanol substitutes (toxic alcohols): no  Date/Time of last alcohol intake: 3/10/2023 around 11 pm, completed 1 bottle of wine   Current signs and symptoms of ethanol withdrawal: anxiety     SEWS Total Score: 0 (3/11/2023  3:29 PM)     Ethanol Withdrawal History  Previous ethanol withdrawal? no  Prior inpatient treatment for ethanol withdrawal? no  Prior outpatient treatment for ethanol withdrawal? no  History of seizures with prior ethanol withdrawal? no  Prior treatment with naltrexone (Vivitrol)? no  Current treatment with naltrexone (Vivitrol)? no  Other current treatment for ethanol use disorder? no  Co-existing substance use? No- denies current use of marijuana, cocaine, meth, heroin  Denies h/o IVDU     Plan: telemetry and continuous pulse ox, SEWS, monitor freq VS, symptoms, administer additional phenobarbital prn, monitor electrolytes and replete prn, RUQ US, GI consult, encourage PO intake and ETOH cessation  Monitor renal function, give IVF, start thiamine and folic acid, CM consult  Pt requesting psychiatry consult dt anxiety and depression  Date: 3/12   Day 2:   Reports that he is feeling much better today compared to yesterday, appetite has improved and N/V/abdominal pain has subsided  Continue above mentioned tx plan  3/12 Per GI:  MELD-Na score: 11 on 3/12  Suspect elevated LFTs are related to acute alcoholic hepatitis, now improving  Suspect hemoglobin drop likely dilutional from IV fluids, and in addition, his initial hemoglobin may have been hemoconcentrated as he has not been eating much for the past month and had only been mainly drinking   Continue PPI, carafate prn, outpt EGD, continue thiamine and folate, ETOH cessation, trend LFTs, high-protein diet, trend CBC, INR and Cr, send viral hep studies and Hpylori stool antigen      3/12 Per Psychiatry:  Pt and CM completed relapse prevention plan   Pt identified symptoms of depression as his main reason for drinking, states that he drinks to "feel happy" but is unwilling to engage in any mental health treatment or consider psychiatric medication to help with these symptoms  Pt states that he believes he can stop drinking on his own despite not being able to identify any coping skills he can utilize to prevent relapse  Pt stated that he had arranged to go to [facility] for rehab however, he stated that he changed his mind and believes all he needs is assistance with detox  Pt indicated that he would like to go home and he will follow up with rehab if needed upon discharge  Pt requested that CM contact [facility] and inform them of pt's decision not to enter treatment at their facility  CM informed pt that substance use treatment resources will be placed on pt's AVS in the event that he changes his mind  Pt indicates a desire to return home upon discharge; pt is declining any assistance with substance use treatment follow up  CM's clinical impression is that pt may significantly benefit from dual diagnosis treatment to address chronic substance use and mental health needs; it appears pt lacks insight around his alcohol use and the impact this has on his mental health   Pt presents in the contemplation stage of change       Vitals   Temperature Pulse Respirations Blood Pressure SpO2   03/11/23 1523 03/11/23 1523 03/11/23 1523 03/11/23 1523 03/11/23 1523   98 1 °F (36 7 °C) 92 18 145/89 99 %      Temp Source Heart Rate Source Patient Position - Orthostatic VS BP Location FiO2 (%)   03/11/23 1523 03/11/23 1523 03/11/23 1523 03/11/23 1523 --   Temporal Monitor Lying Right arm       Pain Score       03/11/23 1549       No Pain          Wt Readings from Last 1 Encounters:   03/11/23 64 1 kg (141 lb 6 4 oz)     Additional Vital Signs:   Date/Time Temp Pulse Resp BP MAP (mmHg) SpO2 O2 Device Patient Position - Orthostatic VS   03/13/23 0715 98 3 °F (36 8 °C) 83 15 132/90 104 97 % None (Room air) Lying   03/13/23 0504 98 2 °F (36 8 °C) 90 16 137/88 -- 98 % None (Room air) Lying   03/12/23 1930 99 °F (37 2 °C) 107 Abnormal  16 140/74 -- 99 % None (Room air) Lying   03/12/23 1537 98 7 °F (37 1 °C) 96 18 135/86 -- 98 % -- --   03/12/23 1114 98 8 °F (37 1 °C) 94 16 138/87 104 98 % None (Room air) Lying   03/12/23 0720 99 °F (37 2 °C) 85 16 145/94 111 98 % None (Room air) Lying   03/12/23 0500 98 °F (36 7 °C) 86 16 130/83 -- 98 % None (Room air) Lying   03/11/23 2130 98 9 °F (37 2 °C) 100 16 123/85 -- 99 % None (Room air) Lying   03/11/23 1952 98 8 °F (37 1 °C) 99 18 136/99 111 98 % None (Room air) Sitting   03/11/23 1722 99 °F (37 2 °C) 89 18 134/91 -- 99 % None (Room air) Lying   03/11/23 1523 98 1 °F (36 7 °C) 92 18 145/89 -- 99 % None (Room air) Lying     Pertinent Labs/Diagnostic Test Results:   3/11 EKG: NSR    US right upper quadrant   Final Result by Romana Dawes, MD (03/12 1764)      Hepatic steatosis and hepatomegaly  No surface nodularity to suggest cirrhosis  Partially contracted gallbladder with mild diffuse wall thickening, nonspecific in the setting of liver disease  No cholelithiasis, definite pericholecystic fluid or sonographic Levine's sign to suggest acute cholecystitis           Workstation performed: [id number]               Results from last 7 days   Lab Units 03/13/23  0539 03/12/23  1253 03/12/23  0456 03/11/23  0955   WBC Thousand/uL 6 27  --  5 42 7 32   HEMOGLOBIN g/dL 10 5* 9 6* 9 4* 12 1   HEMATOCRIT % 32 5* 29 2* 28 7* 36 4*   PLATELETS Thousands/uL 166  --  143* 148*   NEUTROS ABS Thousands/µL  --   --   --  4 16         Results from last 7 days   Lab Units 03/13/23  0534 03/12/23  0456 03/11/23 2028 03/11/23  0955   SODIUM mmol/L 139 136 134* 133*   POTASSIUM mmol/L 3 6 3 5 3 8 4 1   CHLORIDE mmol/L 109* 108 103 97   CO2 mmol/L 28 26 28 26   ANION GAP mmol/L 2* 2* 3* 10   BUN mg/dL 3* 5 6 7   CREATININE mg/dL 0 87 0 86 0 91 1 01   EGFR ml/min/1 73sq m 108 109 105 93   CALCIUM mg/dL 8 4 7 7* 8 3* 8 7   MAGNESIUM mg/dL  --  1 9  --   --      Results from last 7 days   Lab Units 03/13/23  0534 03/12/23  0456 03/11/23  0955   AST U/L 237* 280* 331*   ALT U/L 144* 140* 166*   ALK PHOS U/L 351* 351* 416*   TOTAL PROTEIN g/dL 5 7* 5 0* 5 9*   ALBUMIN g/dL 3 0* 2 5* 3 2*   TOTAL BILIRUBIN mg/dL 4 24* 3 55* 4 29*   BILIRUBIN DIRECT mg/dL  --  2 48*  --          Results from last 7 days   Lab Units 03/13/23  0534 03/12/23  0456 03/11/23 2028 03/11/23  0955   GLUCOSE RANDOM mg/dL 84 87 108* 73     Results from last 7 days   Lab Units 03/11/23  0955   PROTIME seconds 13 2   INR  0 98   PTT seconds 26     Results from last 7 days   Lab Units 03/11/23  0955   LIPASE u/L 36     Results from last 7 days   Lab Units 03/11/23  0955   ETHANOL LVL mg/dL 20*       Past Medical History:   Diagnosis Date   • Anxiety      Present on Admission:  • Alcohol use disorder, severe, dependence (HCC)  • Alcohol withdrawal syndrome with complication (HCC)  • Hyponatremia  • (Resolved) EDWIN (acute kidney injury) ([facility] [address] )  • HTN (hypertension)  • Macrocytic anemia  • Thrombocytopenia (HCC)  • Cigarette nicotine dependence without complication  • (Resolved) Nausea and vomiting  • Abnormal CT of the abdomen      Admitting Diagnosis: Alcohol abuse [F10 10]  Age/Sex: 42 y o  male  Admission Orders:  Scheduled Medications:  amLODIPine, 5 mg, Oral, Daily  folic acid 1 mg, thiamine 100 mg in 0 9% sodium chloride 100 mL IVPB, , Intravenous, Daily  nicotine, 21 mg, Transdermal, Daily  pantoprazole, 40 mg, Intravenous, Q12H NICOLE  sucralfate, 1 g, Oral, 4x Daily (AC & HS)      PHENobarbital tablet 64 8 mg  Dose: 64 8 mg  Freq:  Once Route: PO  Start: 03/11/23 2015 End: 03/11/23 2009   Admin Instructions:   High alert medication    2009 Continuous IV Infusions:    lactated ringers infusion 1,000 mL  Dose: 1000 mL  Freq: Continuous Route: IV  Last Dose: Stopped (03/11/23 1434)  Start: 03/11/23 1230 End: 03/11/23 1507    sodium chloride 0 9 % infusion  Rate: 100 mL/hr Dose: 100 mL/hr  Freq: Continuous Route: IV  Indications of Use: IV Hydration  Last Dose: Stopped (03/12/23 1036)  Start: 03/11/23 1515 End: 03/12/23 1033    PRN Meds:  aluminum-magnesium hydroxide-simethicone, 30 mL, Oral, Q6H PRN  hydrOXYzine HCL, 50 mg, Oral, Q6H PRN x2 thus far  Lidocaine Viscous HCl, 15 mL, Swish & Swallow, 4x Daily PRN  melatonin, 6 mg, Oral, HS PRN x1 thus far  nicotine polacrilex, 2 mg, Oral, Q2H PRN x2 thus far  ondansetron, 4 mg, Intravenous, Q6H PRN  traZODone, 50 mg, Oral, HS PRN x2 thus far    scd    IP CONSULT TO CASE MANAGEMENT  IP CONSULT TO GASTROENTEROLOGY    Network Utilization Review Department  ATTENTION: Please call with any questions or concerns to [phone] and carefully listen to the prompts so that you are directed to the right person  All voicemails are confidential   Anais Shelby all requests for admission clinical reviews, approved or denied determinations and any other requests to dedicated fax number below belonging to the campus where the patient is receiving treatment   List of dedicated fax numbers for the Facilities:  [address] DENIALS (Administrative/Medical Necessity) [phone]   [address] [place] [address] (Maternity/NICU/Pediatrics) [phone]    Nataliia Kaufman [phone]   [facility] [phone]   [facility] [phone]   [address] [phone]

## 2023-03-13 NOTE — ASSESSMENT & PLAN NOTE
Recent Labs     03/11/23  0955 03/12/23  0456 03/12/23  1253 03/13/23  0539   HGB 12 1 9 4*   < > 10 5*   HCT 36 4* 28 7*   < > 32 5*   * 103*  --  107*    < > = values in this interval not displayed       · In setting of chronic alcohol use; likely 2/2 myelosuppression  · hgb improved and stable this AM  · No evidence of acute bleeding- continues to deny hematemesis, melena, hematochezia  · Routine outpatient monitoring   · Continue thiamine, folic acid  · Encourage alcohol cessation rolling walker

## 2023-03-13 NOTE — DISCHARGE INSTRUCTIONS
Please ensure to get follow-up blood work in 1 week to monitor liver enzymes  GI also recommended outpatient endoscopy, a referral has been placed for [facility]  Please try your best to follow-up  Additional, schedule a follow up with your PCP within 1 week

## 2023-03-20 ENCOUNTER — APPOINTMENT (OUTPATIENT)
Dept: LAB | Facility: MEDICAL CENTER | Age: 38
End: 2023-03-20

## 2023-08-02 ENCOUNTER — APPOINTMENT (OUTPATIENT)
Dept: URGENT CARE | Age: 38
End: 2023-08-02

## 2024-07-25 ENCOUNTER — APPOINTMENT (OUTPATIENT)
Dept: URGENT CARE | Age: 39
End: 2024-07-25